# Patient Record
Sex: FEMALE | Race: WHITE | Employment: UNEMPLOYED | URBAN - METROPOLITAN AREA
[De-identification: names, ages, dates, MRNs, and addresses within clinical notes are randomized per-mention and may not be internally consistent; named-entity substitution may affect disease eponyms.]

---

## 2017-12-20 PROBLEM — B00.50 HERPES SIMPLEX OF EYE: Status: ACTIVE | Noted: 2017-12-20

## 2017-12-20 PROBLEM — R30.0 DYSURIA: Status: ACTIVE | Noted: 2017-12-20

## 2017-12-20 PROBLEM — Z34.81 MULTIGRAVIDA IN FIRST TRIMESTER: Status: ACTIVE | Noted: 2017-12-20

## 2018-01-16 PROBLEM — Z34.01 PRIMIGRAVIDA IN FIRST TRIMESTER: Status: ACTIVE | Noted: 2017-12-20

## 2018-01-16 PROBLEM — R30.0 DYSURIA: Status: RESOLVED | Noted: 2017-12-20 | Resolved: 2018-01-16

## 2018-05-08 PROBLEM — O99.013 ANEMIA IN PREGNANCY, THIRD TRIMESTER: Status: ACTIVE | Noted: 2018-05-08

## 2018-05-08 PROBLEM — Z34.03 PRIMIGRAVIDA IN THIRD TRIMESTER: Status: ACTIVE | Noted: 2017-12-20

## 2018-07-30 ENCOUNTER — HOSPITAL ENCOUNTER (INPATIENT)
Age: 23
LOS: 2 days | Discharge: HOME | End: 2018-08-01
Attending: OBSTETRICS & GYNECOLOGY | Admitting: OBSTETRICS & GYNECOLOGY
Payer: SELF-PAY

## 2018-07-30 PROBLEM — Z34.90 ENCOUNTER FOR PLANNED INDUCTION OF LABOR: Status: ACTIVE | Noted: 2018-07-30

## 2018-07-30 PROBLEM — Z3A.40 40 WEEKS GESTATION OF PREGNANCY: Status: ACTIVE | Noted: 2018-07-30

## 2018-07-30 LAB
ABO + RH BLD: NORMAL
BLOOD GROUP ANTIBODIES SERPL: NORMAL
ERYTHROCYTE [DISTWIDTH] IN BLOOD BY AUTOMATED COUNT: 13.9 % (ref 11.9–14.6)
HCT VFR BLD AUTO: 35.8 % (ref 35.8–46.3)
HGB BLD-MCNC: 12 G/DL (ref 11.7–15.4)
MCH RBC QN AUTO: 31.5 PG (ref 26.1–32.9)
MCHC RBC AUTO-ENTMCNC: 33.5 G/DL (ref 31.4–35)
MCV RBC AUTO: 94 FL (ref 79.6–97.8)
PLATELET # BLD AUTO: 209 K/UL (ref 150–450)
PMV BLD AUTO: 11.7 FL (ref 10.8–14.1)
RBC # BLD AUTO: 3.81 M/UL (ref 4.05–5.25)
SPECIMEN EXP DATE BLD: NORMAL
WBC # BLD AUTO: 11 K/UL (ref 4.3–11.1)

## 2018-07-30 PROCEDURE — 85027 COMPLETE CBC AUTOMATED: CPT | Performed by: OBSTETRICS & GYNECOLOGY

## 2018-07-30 PROCEDURE — 74011250637 HC RX REV CODE- 250/637: Performed by: OBSTETRICS & GYNECOLOGY

## 2018-07-30 PROCEDURE — 74011250636 HC RX REV CODE- 250/636: Performed by: OBSTETRICS & GYNECOLOGY

## 2018-07-30 PROCEDURE — 74011258636 HC RX REV CODE- 258/636: Performed by: OBSTETRICS & GYNECOLOGY

## 2018-07-30 PROCEDURE — 65270000029 HC RM PRIVATE

## 2018-07-30 PROCEDURE — 86900 BLOOD TYPING SEROLOGIC ABO: CPT | Performed by: OBSTETRICS & GYNECOLOGY

## 2018-07-30 RX ORDER — CALCIUM CARBONATE 200(500)MG
200 TABLET,CHEWABLE ORAL
Status: DISCONTINUED | OUTPATIENT
Start: 2018-07-30 | End: 2018-08-01 | Stop reason: HOSPADM

## 2018-07-30 RX ORDER — MORPHINE SULFATE 10 MG/ML
5 INJECTION, SOLUTION INTRAMUSCULAR; INTRAVENOUS
Status: DISCONTINUED | OUTPATIENT
Start: 2018-07-30 | End: 2018-07-31 | Stop reason: HOSPADM

## 2018-07-30 RX ORDER — ONDANSETRON 2 MG/ML
4 INJECTION INTRAMUSCULAR; INTRAVENOUS
Status: DISCONTINUED | OUTPATIENT
Start: 2018-07-30 | End: 2018-08-01 | Stop reason: HOSPADM

## 2018-07-30 RX ORDER — OXYTOCIN/RINGER'S LACTATE 30/500 ML
.5-2 PLASTIC BAG, INJECTION (ML) INTRAVENOUS
Status: DISCONTINUED | OUTPATIENT
Start: 2018-07-30 | End: 2018-07-31 | Stop reason: HOSPADM

## 2018-07-30 RX ORDER — ACETAMINOPHEN 325 MG/1
650 TABLET ORAL
Status: DISCONTINUED | OUTPATIENT
Start: 2018-07-30 | End: 2018-08-01 | Stop reason: HOSPADM

## 2018-07-30 RX ORDER — SODIUM CHLORIDE 0.9 % (FLUSH) 0.9 %
5-10 SYRINGE (ML) INJECTION EVERY 8 HOURS
Status: DISCONTINUED | OUTPATIENT
Start: 2018-07-30 | End: 2018-08-01 | Stop reason: HOSPADM

## 2018-07-30 RX ORDER — MINERAL OIL
120 OIL (ML) ORAL
Status: DISCONTINUED | OUTPATIENT
Start: 2018-07-30 | End: 2018-07-31 | Stop reason: HOSPADM

## 2018-07-30 RX ORDER — FAMOTIDINE 20 MG/1
20 TABLET, FILM COATED ORAL
Status: DISCONTINUED | OUTPATIENT
Start: 2018-07-30 | End: 2018-08-01 | Stop reason: HOSPADM

## 2018-07-30 RX ORDER — OXYTOCIN/0.9 % SODIUM CHLORIDE 15/250 ML
250 PLASTIC BAG, INJECTION (ML) INTRAVENOUS ONCE
Status: COMPLETED | OUTPATIENT
Start: 2018-07-30 | End: 2018-07-31

## 2018-07-30 RX ORDER — LIDOCAINE HYDROCHLORIDE 10 MG/ML
1 INJECTION INFILTRATION; PERINEURAL
Status: DISCONTINUED | OUTPATIENT
Start: 2018-07-30 | End: 2018-07-31 | Stop reason: HOSPADM

## 2018-07-30 RX ORDER — LIDOCAINE HYDROCHLORIDE 20 MG/ML
JELLY TOPICAL
Status: DISCONTINUED | OUTPATIENT
Start: 2018-07-30 | End: 2018-07-31 | Stop reason: HOSPADM

## 2018-07-30 RX ORDER — VALACYCLOVIR HYDROCHLORIDE 500 MG/1
500 TABLET, FILM COATED ORAL DAILY
Status: DISCONTINUED | OUTPATIENT
Start: 2018-07-31 | End: 2018-08-01 | Stop reason: HOSPADM

## 2018-07-30 RX ORDER — DEXTROSE, SODIUM CHLORIDE, SODIUM LACTATE, POTASSIUM CHLORIDE, AND CALCIUM CHLORIDE 5; .6; .31; .03; .02 G/100ML; G/100ML; G/100ML; G/100ML; G/100ML
125 INJECTION, SOLUTION INTRAVENOUS CONTINUOUS
Status: DISCONTINUED | OUTPATIENT
Start: 2018-07-30 | End: 2018-08-01

## 2018-07-30 RX ORDER — SODIUM CHLORIDE 0.9 % (FLUSH) 0.9 %
5-10 SYRINGE (ML) INJECTION AS NEEDED
Status: DISCONTINUED | OUTPATIENT
Start: 2018-07-30 | End: 2018-08-01 | Stop reason: HOSPADM

## 2018-07-30 RX ORDER — ZOLPIDEM TARTRATE 5 MG/1
5 TABLET ORAL
Status: DISCONTINUED | OUTPATIENT
Start: 2018-07-30 | End: 2018-07-31 | Stop reason: SDUPTHER

## 2018-07-30 RX ADMIN — ZOLPIDEM TARTRATE 5 MG: 5 TABLET ORAL at 22:43

## 2018-07-30 RX ADMIN — SODIUM CHLORIDE, SODIUM LACTATE, POTASSIUM CHLORIDE, AND CALCIUM CHLORIDE 500 ML: 600; 310; 30; 20 INJECTION, SOLUTION INTRAVENOUS at 22:10

## 2018-07-30 RX ADMIN — MISOPROSTOL 50 MCG: 100 TABLET ORAL at 20:17

## 2018-07-30 RX ADMIN — SODIUM CHLORIDE, SODIUM LACTATE, POTASSIUM CHLORIDE, CALCIUM CHLORIDE, AND DEXTROSE MONOHYDRATE 125 ML/HR: 600; 310; 30; 20; 5 INJECTION, SOLUTION INTRAVENOUS at 22:03

## 2018-07-30 RX ADMIN — ACETAMINOPHEN 650 MG: 325 TABLET ORAL at 21:32

## 2018-07-30 NOTE — PROGRESS NOTES
Patient ID verified. Allergies, medical history, prenatal record and prior to admission medications verified. Pt instructed to be NPO after midnight. Pt instructed to arrive at hospital @1800,come to entrance C and sign in at the registration desk on the 4th floor. Patient instructed to come to hospital sooner if SROM, labor, or concerning symptoms. Patient verbalized understanding. Questions encouraged and answered.

## 2018-07-30 NOTE — PROGRESS NOTES
Spoke with Vida Lara Md, PRN medication orders received.  Per Md if pt is less than 4cm give Cytotec (may give if 3cm), if 4cm or greater hold next dose of Cytotec and start IV Pitocin at 6am.

## 2018-07-30 NOTE — IP AVS SNAPSHOT
[address] 
[phone] Patient: Katarina Gotti MRN: [id number] :1995 About your hospitalization You were admitted on:  2018 You last received care in the:  [address] [facility] You were discharged on:  2018 Why you were hospitalized Your primary diagnosis was:  Not on File Your diagnoses also included:  40 Weeks Gestation Of Pregnancy, Encounter For Planned Induction Of Labor Follow-up Information Follow up With Details Comments Contact Info Joyce Pérez MD Schedule an appointment as soon as possible for a visit in 6 weeks for postpartum checkup [address] [facility] [address] 
[phone] Your Scheduled Appointments 2018 10:15 AM EDT  
EST GYN with Joyce Pérez MD  
[facility] ([facility]) [address] [place] [id number]  
[phone] Discharge Orders None A check karol indicates which time of day the medication should be taken. My Medications START taking these medications Instructions Each Dose to Equal  
 Morning Noon Evening Bedtime HYDROcodone-acetaminophen 5-325 mg per tablet Commonly known as:  Mirta Sathya Your last dose was: Your next dose is: Take 1 Tab by mouth every six (6) hours as needed. Max Daily Amount: 4 Tabs. 1 Tab  
    
   
   
   
  
 ibuprofen 600 mg tablet Commonly known as:  MOTRIN Your last dose was: Your next dose is: Take 1 Tab by mouth every six (6) hours as needed. 600 mg CONTINUE taking these medications Instructions Each Dose to Equal  
 Morning Noon Evening Bedtime  
 ferrous sulfate 325 mg (65 mg iron) tablet Your last dose was: Your next dose is: Take  by mouth two (2) times a day. fluorometholone 0.1 % ophthalmic suspension Commonly known as:  FML Your last dose was: Your next dose is:    
   
   
 Administer 1 Drop to both eyes daily. 1 Drop PRENATAL VITAMIN PO Your last dose was: Your next dose is: Take  by mouth. valACYclovir 500 mg tablet Commonly known as:  VALTREX Your last dose was: Your next dose is: TAKE 1 TABLET BY MOUTH EVERY DAY Where to Get Your Medications Information on where to get these meds will be given to you by the nurse or doctor. ! Ask your nurse or doctor about these medications HYDROcodone-acetaminophen 5-325 mg per tablet  
 ibuprofen 600 mg tablet Opioid Education Prescription Opioids: What You Need to Know: 
 
Prescription opioids can be used to help relieve moderate-to-severe pain and are often prescribed following a surgery or injury, or for certain health conditions. These medications can be an important part of treatment but also come with serious risks. Opioids are strong pain medicines. Examples include hydrocodone, oxycodone, fentanyl, and morphine. Heroin is an example of an illegal opioid. It is important to work with your health care provider to make sure you are getting the safest, most effective care. WHAT ARE THE RISKS AND SIDE EFFECTS OF OPIOID USE? Prescription opioids carry serious risks of addiction and overdose, especially with prolonged use. An opioid overdose, often marked by slow breathing, can cause sudden death. The use of prescription opioids can have a number of side effects as well, even when taken as directed. · Tolerance-meaning you might need to take more of a medication for the same pain relief · Physical dependence-meaning you have symptoms of withdrawal when the medication is stopped. Withdrawal symptoms can include nausea, sweating, chills, diarrhea, stomach cramps, and muscle aches. Withdrawal can last up to several weeks, depending on which drug you took and how long you took it. · Increased sensitivity to pain · Constipation · Nausea, vomiting, and dry mouth · Sleepiness and dizziness · Confusion · Depression · Low levels of testosterone that can result in lower sex drive, energy, and strength · Itching and sweating RISKS ARE GREATER WITH:      
· History of drug misuse, substance use disorder, or overdose · Mental health conditions (such as depression or anxiety) · Sleep apnea · Older age (72 years or older) · Pregnancy Avoid alcohol while taking prescription opioids. Also, unless specifically advised by your health care provider, medications to avoid include: · Benzodiazepines (such as Xanax or Valium) · Muscle relaxants (such as Soma or Flexeril) · Hypnotics (such as Ambien or Lunesta) · Other prescription opioids KNOW YOUR OPTIONS Talk to your health care provider about ways to manage your pain that don't involve prescription opioids. Some of these options may actually work better and have fewer risks and side effects. Options may include: 
· Pain relievers such as acetaminophen, ibuprofen, and naproxen · Some medications that are also used for depression or seizures · Physical therapy and exercise · Counseling to help patients learn how to cope better with triggers of pain and stress. · Application of heat or cold compress · Massage therapy · Relaxation techniques Be Informed Make sure you know the name of your medication, how much and how often to take it, and its potential risks & side effects. IF YOU ARE PRESCRIBED OPIOIDS FOR PAIN: 
· Never take opioids in greater amounts or more often than prescribed. Remember the goal is not to be pain-free but to manage your pain at a tolerable level. · Follow up with your primary care provider to: · Work together to create a plan on how to manage your pain. · Talk about ways to help manage your pain that don't involve prescription opioids. · Talk about any and all concerns and side effects. · Help prevent misuse and abuse. · Never sell or share prescription opioids · Help prevent misuse and abuse. · Store prescription opioids in a secure place and out of reach of others (this may include visitors, children, friends, and family). · Safely dispose of unused/unwanted prescription opioids: Find your community drug take-back program or your pharmacy mail-back program, or flush them down the toilet, following guidance from the Food and Drug Administration (www.fda.gov/Drugs/ResourcesForYou). · Visit www.cdc.gov/drugoverdose to learn about the risks of opioid abuse and overdose. · If you believe you may be struggling with addiction, tell your health care provider and ask for guidance or call [employer] at 5-898-139-HSPY. Discharge Instructions Discharge instruction to follow: Activity: Pelvis rest for 6 weeks No heavy lifting over 15 lbs for 2 weeks No driving for 2 weeks No push/pull motion such as sweeping or vacuuming for 2 weeks No tub baths for 6 weeks Continue using the hygenique wand after each void or bowel movement. If using sitz bath continue until comfortable stopping. If using jhoana-bottle continue to use until comfortable stopping. Change sanitary pad after each urination or bowel movement. Call MD for the following: 
    Fever over 101 F; pain not relieved by medication; foul smelling vaginal discharge or an increase in vaginal bleeding. Take medication as prescribed. Follow up with MD as order. Vaginal Childbirth: Care Instructions Your Care Instructions Your body will slowly heal in the next few weeks. It is easy to get too tired and overwhelmed during the first weeks after your baby is born. Changes in your hormones can shift your mood without warning. You may find it hard to meet the extra demands on your energy and time. Take it easy on yourself. Follow-up care is a key part of your treatment and safety. Be sure to make and go to all appointments, and call your doctor if you are having problems. It's also a good idea to know your test results and keep a list of the medicines you take. How can you care for yourself at home? · Vaginal bleeding and cramps ¨ After delivery, you will have a bloody discharge from the vagina. This will turn pink within a week and then white or yellow after about 10 days. It may last for 2 to 4 weeks or longer, until the uterus has healed. Use pads instead of tampons until you stop bleeding. ¨ Do not worry if you pass some blood clots, as long as they are smaller than a golf ball. If you have a tear or stitches in your vaginal area, change the pad at least every 4 hours to prevent soreness and infection. ¨ You may have cramps for the first few days after childbirth. These are normal and occur as the uterus shrinks to normal size. Take an over-the-counter pain medicine, such as acetaminophen (Tylenol), ibuprofen (Advil, Motrin), or naproxen (Aleve), for cramps. Read and follow all instructions on the label. Do not take aspirin, because it can cause more bleeding. ¨ Do not take two or more pain medicines at the same time unless the doctor told you to. Many pain medicines have acetaminophen, which is Tylenol. Too much acetaminophen (Tylenol) can be harmful. · Stitches ¨ If you have stitches, they will dissolve on their own and do not need to be removed. Follow your doctor's instructions for cleaning the stitched area.  
¨ Put ice or a cold pack on your painful area for 10 to 20 minutes at a time, several times a day, for the first few days. Put a thin cloth between the ice and your skin. ¨ Sit in a few inches of warm water (sitz bath) 3 times a day and after bowel movements. The warm water helps with pain and itching. If you do not have a tub, a warm shower might help. · Breast fullness ¨ Your breasts may overfill (engorge) in the first few days after delivery. To help milk flow and to relieve pain, warm your breasts in the shower or by using warm, moist towels before nursing. ¨ If you are not nursing, do not put warmth on your breasts or touch your breasts. Wear a tight bra or sports bra and use ice until the fullness goes away. This usually takes 2 to 3 days. ¨ Put ice or a cold pack on your breast after nursing to reduce swelling and pain. Put a thin cloth between the ice and your skin. · Activity ¨ Eat a balanced diet. Do not try to lose weight by cutting calories. Keep taking your prenatal vitamins, or take a multivitamin. ¨ Get as much rest as you can. Try to take naps when your baby sleeps during the day. ¨ Get some exercise every day. But do not do any heavy exercise until your doctor says it is okay. ¨ Wait until you are healed (about 4 to 6 weeks) before you have sexual intercourse. Your doctor will tell you when it is okay to have sex. ¨ Talk to your doctor about birth control. You can get pregnant even before your period returns. Also, you can get pregnant while you are breastfeeding. · Mental health ¨ It is normal to have some sadness, anxiety, sleeplessness, and mood swings after you go home. If you feel upset or hopeless for more than a few days or are having trouble doing the things you need to do, talk to your doctor. · Constipation and hemorrhoids ¨ Drink plenty of fluids, enough so that your urine is light yellow or clear like water.  If you have kidney, heart, or liver disease and have to limit fluids, talk with your doctor before you increase the amount of fluids you drink. ¨ Eat plenty of fiber each day. Have a bran muffin or bran cereal for breakfast, and try eating a piece of fruit for a mid-afternoon snack. ¨ For painful, itchy hemorrhoids, put ice or a cold pack on the area several times a day for 10 minutes at a time. Follow this by putting a warm compress on the area for another 10 to 20 minutes or by sitting in a shallow, warm bath. When should you call for help? Call 911 anytime you think you may need emergency care. For example, call if: 
  · You passed out (lost consciousness).  
 Call your doctor now or seek immediate medical care if: 
  · You have severe vaginal bleeding.  
  · You are dizzy or lightheaded, or you feel like you may faint.  
  · You have a fever.  
  · You have new or more pain in your belly or pelvis.  
 Watch closely for changes in your health, and be sure to contact your doctor if: 
  · Your vaginal bleeding seems to be getting heavier.  
  · You have new or worse vaginal discharge.  
  · You feel sad, anxious, or hopeless for more than a few days.  
  · You do not get better as expected. Where can you learn more? Go to http://leland-chaim.info/. Enter Z803 in the search box to learn more about \"Vaginal Childbirth: Care Instructions. \" Current as of: November 21, 2017 Content Version: 11.7 © 9136-6564 [employer]. Care instructions adapted under license by [employer] (which disclaims liability or warranty for this information). If you have questions about a medical condition or this instruction, always ask your healthcare professional. [place] [address] any warranty or liability for your use of this information. Introducing [facility] & HEALTH SERVICES! Dear Prasad Quinones: Thank you for requesting a [employer] account. Our records indicate that you already have an active [employer] account. You can access your account anytime at [email or web address] Did you know that you can access your hospital and ER discharge instructions at any time in MyPublisher? You can also review all of your test results from your hospital stay or ER visit. Additional Information If you have questions, please visit the Frequently Asked Questions section of the [employer] website at [email or web address] Remember, MyPublisher is NOT to be used for urgent needs. For medical emergencies, dial 911. Now available from your iPhone and Android! Introducing Samuel Bedoya As a [facility] patient, I wanted to make you aware of our electronic visit tool called Samuel Bedoya. [employer]Samba [employer]ds allows you to connect within minutes with a medical provider 24 hours a day, seven days a week via a mobile device or tablet or logging into a secure website from your computer. You can access Samuel Bedoya from anywhere in the [place]. A virtual visit might be right for you when you have a simple condition and feel like you just dont want to get out of bed, or cant get away from work for an appointment, when your regular [facility] provider is not available (evenings, weekends or holidays), or when youre out of town and need minor care. Electronic visits cost only $49 and if the [employer] provider determines a prescription is needed to treat your condition, one can be electronically transmitted to a nearby pharmacy*. Please take a moment to enroll today if you have not already done so. The enrollment process is free and takes just a few minutes. To enroll, please download the [employer] victoria to your tablet or phone, or visit [email or web address] to enroll on your computer. And, as an [address] [place] [address] patient with a [employer]approved account, the results of your visits will be scanned into your electronic medical record and your primary care provider will be able to view the scanned results. We urge you to continue to see your regular [employer] provider for your ongoing medical care. And while your primary care provider may not be the one available when you seek a Samuel Tangdavidfin virtual visit, the peace of mind you get from getting a real diagnosis real time can be priceless. For more information on Samuel Directlydavidfin, view our Frequently Asked Questions (FAQs) at [email or web address]. Sincerely, 
 
Key Cardona MD 
Chief Medical Officer [employer] *:  certain medications cannot be prescribed via [employer] Providers Seen During Your Hospitalization Provider Specialty Primary office phone Cory Sun MD Obstetrics & Gynecology [phone] Immunizations Administered for This Admission Name Date Tdap  Deferred () Your Primary Care Physician (PCP) Primary Care Physician Office Phone Office Fax Moni Palacios [phone] You are allergic to the following No active allergies Recent Documentation Breastfeeding? OB Status Smoking Status Unknown Recent pregnancy Never Smoker Emergency Contacts Name Discharge Info Relation Home Work Mobile Shannan Abdalla  Spouse [phone] Patient Belongings The following personal items are in your possession at time of discharge: 
     Visual Aid: None      Home Medications: None   Jewelry: None  Clothing: At bedside    Other Valuables: Cell Phone Please provide this summary of care documentation to your next provider. Signatures-by signing, you are acknowledging that this After Visit Summary has been reviewed with you and you have received a copy. Patient Signature:  ____________________________________________________________ Date:  ____________________________________________________________  
  
[employer]  Provider Signature: ____________________________________________________________ Date:  ____________________________________________________________

## 2018-07-31 ENCOUNTER — ANESTHESIA EVENT (OUTPATIENT)
Dept: LABOR AND DELIVERY | Age: 23
End: 2018-07-31
Payer: SELF-PAY

## 2018-07-31 ENCOUNTER — ANESTHESIA (OUTPATIENT)
Dept: LABOR AND DELIVERY | Age: 23
End: 2018-07-31
Payer: SELF-PAY

## 2018-07-31 PROCEDURE — 77030011943

## 2018-07-31 PROCEDURE — 4A1HXCZ MONITORING OF PRODUCTS OF CONCEPTION, CARDIAC RATE, EXTERNAL APPROACH: ICD-10-PCS | Performed by: OBSTETRICS & GYNECOLOGY

## 2018-07-31 PROCEDURE — 77030018846 HC SOL IRR STRL H20 ICUM -A

## 2018-07-31 PROCEDURE — 77030003028 HC SUT VCRL J&J -A

## 2018-07-31 PROCEDURE — 75410000003 HC RECOV DEL/VAG/CSECN EA 0.5 HR: Performed by: OBSTETRICS & GYNECOLOGY

## 2018-07-31 PROCEDURE — 59400 OBSTETRICAL CARE: CPT | Performed by: OBSTETRICS & GYNECOLOGY

## 2018-07-31 PROCEDURE — 74011250636 HC RX REV CODE- 250/636: Performed by: OBSTETRICS & GYNECOLOGY

## 2018-07-31 PROCEDURE — 0UQMXZZ REPAIR VULVA, EXTERNAL APPROACH: ICD-10-PCS | Performed by: OBSTETRICS & GYNECOLOGY

## 2018-07-31 PROCEDURE — 65270000029 HC RM PRIVATE

## 2018-07-31 PROCEDURE — 75410000000 HC DELIVERY VAGINAL/SINGLE

## 2018-07-31 PROCEDURE — A4300 CATH IMPL VASC ACCESS PORTAL: HCPCS | Performed by: ANESTHESIOLOGY

## 2018-07-31 PROCEDURE — 74011250636 HC RX REV CODE- 250/636

## 2018-07-31 PROCEDURE — 76060000078 HC EPIDURAL ANESTHESIA

## 2018-07-31 PROCEDURE — 74011250637 HC RX REV CODE- 250/637: Performed by: OBSTETRICS & GYNECOLOGY

## 2018-07-31 PROCEDURE — 77030020255 HC SOL INJ LR 1000ML BG

## 2018-07-31 PROCEDURE — 0KQM0ZZ REPAIR PERINEUM MUSCLE, OPEN APPROACH: ICD-10-PCS | Performed by: OBSTETRICS & GYNECOLOGY

## 2018-07-31 PROCEDURE — 75410000002 HC LABOR FEE PER 1 HR

## 2018-07-31 PROCEDURE — 77030014125 HC TY EPDRL BBMI -B: Performed by: ANESTHESIOLOGY

## 2018-07-31 RX ORDER — ROPIVACAINE HYDROCHLORIDE 2 MG/ML
INJECTION, SOLUTION EPIDURAL; INFILTRATION; PERINEURAL AS NEEDED
Status: DISCONTINUED | OUTPATIENT
Start: 2018-07-31 | End: 2018-07-31 | Stop reason: HOSPADM

## 2018-07-31 RX ORDER — DIPHENHYDRAMINE HCL 25 MG
25 CAPSULE ORAL
Status: DISCONTINUED | OUTPATIENT
Start: 2018-07-31 | End: 2018-08-01 | Stop reason: HOSPADM

## 2018-07-31 RX ORDER — ROPIVACAINE HYDROCHLORIDE 2 MG/ML
INJECTION, SOLUTION EPIDURAL; INFILTRATION; PERINEURAL
Status: DISCONTINUED | OUTPATIENT
Start: 2018-07-31 | End: 2018-07-31 | Stop reason: HOSPADM

## 2018-07-31 RX ORDER — OXYCODONE HYDROCHLORIDE 5 MG/1
5-10 TABLET ORAL
Status: DISCONTINUED | OUTPATIENT
Start: 2018-07-31 | End: 2018-08-01 | Stop reason: HOSPADM

## 2018-07-31 RX ORDER — PRENATAL VIT 96/IRON FUM/FOLIC 27MG-0.8MG
1 TABLET ORAL DAILY
Status: DISCONTINUED | OUTPATIENT
Start: 2018-07-31 | End: 2018-08-01 | Stop reason: HOSPADM

## 2018-07-31 RX ORDER — DOCUSATE SODIUM 100 MG/1
100 CAPSULE, LIQUID FILLED ORAL
Status: DISCONTINUED | OUTPATIENT
Start: 2018-07-31 | End: 2018-08-01 | Stop reason: HOSPADM

## 2018-07-31 RX ORDER — HYDROCODONE BITARTRATE AND ACETAMINOPHEN 5; 325 MG/1; MG/1
1 TABLET ORAL
Status: DISCONTINUED | OUTPATIENT
Start: 2018-07-31 | End: 2018-08-01 | Stop reason: HOSPADM

## 2018-07-31 RX ORDER — OXYTOCIN/0.9 % SODIUM CHLORIDE 15/250 ML
250 PLASTIC BAG, INJECTION (ML) INTRAVENOUS ONCE
Status: ACTIVE | OUTPATIENT
Start: 2018-07-31 | End: 2018-07-31

## 2018-07-31 RX ORDER — ZOLPIDEM TARTRATE 5 MG/1
5 TABLET ORAL
Status: DISCONTINUED | OUTPATIENT
Start: 2018-07-31 | End: 2018-08-01 | Stop reason: HOSPADM

## 2018-07-31 RX ORDER — IBUPROFEN 600 MG/1
600 TABLET ORAL
Status: DISCONTINUED | OUTPATIENT
Start: 2018-07-31 | End: 2018-08-01 | Stop reason: HOSPADM

## 2018-07-31 RX ORDER — SODIUM CHLORIDE 0.9 % (FLUSH) 0.9 %
5-10 SYRINGE (ML) INJECTION AS NEEDED
Status: DISCONTINUED | OUTPATIENT
Start: 2018-07-31 | End: 2018-07-31 | Stop reason: HOSPADM

## 2018-07-31 RX ORDER — PROMETHAZINE HYDROCHLORIDE 25 MG/1
25 TABLET ORAL
Status: DISCONTINUED | OUTPATIENT
Start: 2018-07-31 | End: 2018-08-01 | Stop reason: HOSPADM

## 2018-07-31 RX ORDER — SIMETHICONE 80 MG
80 TABLET,CHEWABLE ORAL
Status: DISCONTINUED | OUTPATIENT
Start: 2018-07-31 | End: 2018-08-01 | Stop reason: HOSPADM

## 2018-07-31 RX ORDER — SODIUM CHLORIDE 0.9 % (FLUSH) 0.9 %
5-10 SYRINGE (ML) INJECTION EVERY 8 HOURS
Status: DISCONTINUED | OUTPATIENT
Start: 2018-07-31 | End: 2018-07-31 | Stop reason: HOSPADM

## 2018-07-31 RX ADMIN — ROPIVACAINE HYDROCHLORIDE 8 ML/HR: 2 INJECTION, SOLUTION EPIDURAL; INFILTRATION; PERINEURAL at 00:30

## 2018-07-31 RX ADMIN — Medication 10 ML: at 04:48

## 2018-07-31 RX ADMIN — IBUPROFEN 600 MG: 600 TABLET ORAL at 21:57

## 2018-07-31 RX ADMIN — Medication 1 SPRAY: at 07:51

## 2018-07-31 RX ADMIN — IBUPROFEN 600 MG: 600 TABLET ORAL at 05:52

## 2018-07-31 RX ADMIN — HYDROCODONE BITARTRATE AND ACETAMINOPHEN 1 TABLET: 5; 325 TABLET ORAL at 17:31

## 2018-07-31 RX ADMIN — HYDROCODONE BITARTRATE AND ACETAMINOPHEN 1 TABLET: 5; 325 TABLET ORAL at 13:38

## 2018-07-31 RX ADMIN — PRENATAL VIT W/ FE FUMARATE-FA TAB 27-0.8 MG 1 TABLET: 27-0.8 TAB at 07:51

## 2018-07-31 RX ADMIN — ROPIVACAINE HYDROCHLORIDE 8 ML: 2 INJECTION, SOLUTION EPIDURAL; INFILTRATION; PERINEURAL at 00:30

## 2018-07-31 RX ADMIN — WITCH HAZEL 1 PAD: 500 SOLUTION RECTAL; TOPICAL at 07:51

## 2018-07-31 RX ADMIN — OXYTOCIN 15000 MILLI-UNITS/HR: 10 INJECTION, SOLUTION INTRAMUSCULAR; INTRAVENOUS at 04:11

## 2018-07-31 RX ADMIN — HYDROCODONE BITARTRATE AND ACETAMINOPHEN 1 TABLET: 5; 325 TABLET ORAL at 07:50

## 2018-07-31 RX ADMIN — IBUPROFEN 600 MG: 600 TABLET ORAL at 13:38

## 2018-07-31 RX ADMIN — HYDROCODONE BITARTRATE AND ACETAMINOPHEN 1 TABLET: 5; 325 TABLET ORAL at 21:57

## 2018-07-31 NOTE — LACTATION NOTE

## 2018-07-31 NOTE — PROGRESS NOTES
SVE done as pt noted to be very uncomfortable with ctx, breathing thru them and needing frequent position changes.

## 2018-07-31 NOTE — PROGRESS NOTES
S/p delivery viable male . With assist pt able to Bf  as she states her feeding plan is to BF. Was given Motrin for mild abdominal cramping, and was able to get OOB with the RN and able to Void 800cc's, jhoana-care done and back to bed.

## 2018-07-31 NOTE — H&P
Mika Andrews OB H&P Assessment/Plan Problem List  Date Reviewed: 2018 Codes Class 40 weeks gestation of pregnancy ICD-10-CM: Z3A.40 
ICD-9-CM: V22.2 Encounter for planned induction of labor ICD-10-CM: Z34.90 ICD-9-CM: V22.1 Anemia in pregnancy, third trimester ICD-10-CM: O99.013 ICD-9-CM: 400.54 Overview Signed 2018 11:29 AM by Nichelle Rodriguez MD  
  Additional Fe Primigravida in third trimester ICD-10-CM: Z34.03 
ICD-9-CM: V22.0 Overview Addendum 3/12/2018 10:41 AM by Nichelle Rodriguez MD  
  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  
  On valtrex and steroid drops for supression Subjective Gerson [place] 23 y.o.  40w4d  presented to L&D for postdates IOL. Pt has no complaints today. Pt denies vaginal bleeding/discharge. No LOF.  +FM. OB History  Para Term  AB Living  
1 SAB TAB Ectopic Molar Multiple Live Births # Outcome Date GA Lbr Pete/2nd Weight Sex Delivery Anes PTL Lv  
1 Current Past Medical History:  
Diagnosis Date  Corneal ulcer of left eye  Herpes   
 eye lesion History reviewed. No pertinent surgical history. Family History Problem Relation Age of Onset  No Known Problems Mother  No Known Problems Father Social History Social History  Marital status:  Spouse name: N/A  
 Number of children: N/A  
 Years of education: N/A Occupational History  Not on file. Social History Main Topics  Smoking status: Never Smoker  Smokeless tobacco: Never Used  Alcohol use No  
   Comment: rarely  Drug use: No  
 Sexual activity: Yes  
  Partners: Male Other Topics Concern  Caffeine Concern No  
 Exercise Yes  [facility] Yes  Self-Exams Yes Social History Narrative Abuse: Feels safe at home, no history of physical abuse, no history of sexual abuse No Known Allergies Review of Systems: 
 
Constitutional: No fevers or chills Prenatal: + fetal movement, no VB/DC, no LOF  
 
CV: No chest pain or palpatations Resp: No SOB or cough GI: No nausea/vomiting/diarrhea/constipation Neuro: No HA, no seizure like activity Skin: No rashes or lesions Breast: No breast pain : No dysuria or hematuria Prenatal Record Review The prenatal record has been reviewed. Prenatal Labs:  
No results found for: RUBELLAEXT, GRBSEXT, HBSAGEXT, HIVEXT, RPREXT, GONNOEXT, CHLAMEXT Objective Visit Vitals  BP (!) 138/92  Pulse 80  Temp 98.1 °F (36.7 °C)  LMP 10/20/2017 (Exact Date)  Breastfeeding No  
 
 
 
Obstetric Exam 
 
SVE: 1/40/-3 Physical Exam 
 
Gen: alert and cooperative, NAD HEENT: NCAT 
 
CV: RRR 
 
RESP: CTA bilat ABD: Gravid, soft, NT 
 
EXT: trace edema bilat NEURO: No focal deficits SKIN: No noted rashes or lesions Marj Mcclellan MD 
3:41 AM 
07/31/18

## 2018-07-31 NOTE — ANESTHESIA PROCEDURE NOTES
Epidural Block Start time: 7/31/2018 12:23 AM 
End time: 7/31/2018 12:30 AM 
Performed by: Clarissa Elizalde Authorized by: Clarissa Elizalde  
 
Pre-Procedure Indication: labor epidural   
Preanesthetic Checklist: patient identified, risks and benefits discussed, anesthesia consent, site marked, patient being monitored, timeout performed and anesthesia consent Timeout Time: 00:22 Epidural:  
Patient position:  Seated Prep region:  Lumbar Prep: Patient draped and Chlorhexidine Location:  L3-4 Needle and Epidural Catheter:  
Needle Type:  Tuohy Needle Gauge:  17 G Injection Technique:  Loss of resistance using air Attempts:  1 Catheter Size:  19 G Depth in Epidural Space (cm):  4 Events: no blood with aspiration, no cerebrospinal fluid with aspiration, no paresthesia and negative aspiration test   
Test Dose:  Negative and lidocaine 1.5% w/ epi Assessment:  
Catheter Secured:  Tegaderm and tape Insertion:  Uncomplicated Patient tolerance:  Patient tolerated the procedure well with no immediate complications

## 2018-07-31 NOTE — PROGRESS NOTES
Spoke with Cristina Farah Md updated him on SROM and pts CTX pattern, pt requests epidural, per Md pt may have epidural

## 2018-07-31 NOTE — PROGRESS NOTES
Pt lying down s/p epidural placement, pt positioned in L tilt and reporting good pain relief from ctx pain. A time out was done prior to placing epidural, pt name, , allergies and procedure were verified, see anesthesia records. LR IV fluid bolus 1L was given per epidural per anesthesia order

## 2018-07-31 NOTE — ANESTHESIA PREPROCEDURE EVALUATION
Anesthetic History Review of Systems / Medical History Patient summary reviewed, nursing notes reviewed and pertinent labs reviewed Pulmonary Neuro/Psych Cardiovascular Exercise tolerance: >4 METS 
  
GI/Hepatic/Renal 
  
 
 
 
 
 
 Endo/Other Obesity Other Findings Comments: Ocular herpes Physical Exam 
 
Airway Mallampati: I 
TM Distance: 4 - 6 cm Neck ROM: normal range of motion Mouth opening: Normal 
 
 Cardiovascular Dental 
No notable dental hx Pulmonary Abdominal 
GI exam deferred Other Findings Anesthetic Plan ASA: 1 Anesthesia type: epidural 
 
 
 
 
 
Anesthetic plan and risks discussed with: Patient and Spouse

## 2018-07-31 NOTE — ROUTINE PROCESS
SBAR IN Report: Mother Verbal report received from Monalisa MERIDA on this patient, who is now being transferred from L&D (unit) for routine progression of care. The patient is not wearing a green \"Anesthesia-Duramorph\" band. Report consisted of patient's Situation, Background, Assessment and Recommendations (SBAR). [place] ID bands were compared with the identification form, and verified with the patient and transferring nurse. Information from the SBAR and the [place]an Report was reviewed with the transferring nurse; opportunity for questions and clarification provided.

## 2018-07-31 NOTE — ANESTHESIA POSTPROCEDURE EVALUATION
Post-Anesthesia Evaluation and Assessment Patient: Tonia Hooks MRN: [id number]  SSN: [id number] YOB: 1995  Age: 24 y.o. Sex: female Cardiovascular Function/Vital Signs Visit Vitals  BP (!) 138/92  Pulse 80  Temp 36.7 °C (98.1 °F)  Breastfeeding No  
 
 
Patient is status post epidural anesthesia for * No procedures listed *. Nausea/Vomiting: None Postoperative hydration reviewed and adequate. Pain: 
Pain Scale 1: Numeric (0 - 10) (07/30/18 1926) Pain Intensity 1: 0 (07/30/18 1926) Managed Neurological Status: At baseline Mental Status and Level of Consciousness: Arousable Pulmonary Status:  
   
Adequate oxygenation and airway patent Complications related to anesthesia: None Post-anesthesia assessment completed. No concerns Signed By: Avis Velazquez MD   
 July 31, 2018

## 2018-07-31 NOTE — LACTATION NOTE
Assisted with breastfeeding in football and cross cradle on L. Baby fed fairly well. Some on and off, but can stay on. Demonstrated manual lip flange. Encouraged frequent feeding and watch output. Reviewed first 24 hours. Noted mom pumped earlier due to low blood sugars and got 5 ml. Mom can pump if baby does not nurse well.

## 2018-07-31 NOTE — L&D DELIVERY NOTE
Present for entire delivery. Details in delivery summary. . Viable Male Infant, APGARS 9,9 .   Weight 8 lbs. , 15 oz.  mL  Pain mgmt  Epidural    Nuchal cord x 1 manually reduced without difficulty    Placenta spont, intact, 3VC. 2nd degree midline perineal and left labial lacerations - all repaired 3-0 vicryl rapide    Pt and infant stable.         Darrell Mehat MD     4:29 AM    18

## 2018-07-31 NOTE — PROGRESS NOTES
SBAR OUT Report: Mother Verbal report given to [facility] RN on this patient, who is now being transferred to MIU  (unit) for routine progression of care. The patient is not wearing a green \"Anesthesia-Duramorph\" band. Report consisted of patient's Situation, Background, Assessment and Recommendations (SBAR). [place] ID bands were compared with the identification form, and verified with the patient and receiving nurse.  
 
Information from the SBAR, Intake/Output and MAR

## 2018-07-31 NOTE — PROGRESS NOTES
SVE 9.5cm, maternal position change from L tilt to R tilt as several FHR variables. Pt resting and plan to re-check SVE in aprox an hr.

## 2018-08-01 VITALS
TEMPERATURE: 97.6 F | HEART RATE: 72 BPM | RESPIRATION RATE: 16 BRPM | DIASTOLIC BLOOD PRESSURE: 66 MMHG | SYSTOLIC BLOOD PRESSURE: 113 MMHG

## 2018-08-01 PROCEDURE — 74011250637 HC RX REV CODE- 250/637: Performed by: OBSTETRICS & GYNECOLOGY

## 2018-08-01 RX ORDER — IBUPROFEN 600 MG/1
600 TABLET ORAL
Qty: 60 TAB | Refills: 1 | Status: SHIPPED | OUTPATIENT
Start: 2018-08-01 | End: 2018-09-11

## 2018-08-01 RX ORDER — HYDROCODONE BITARTRATE AND ACETAMINOPHEN 5; 325 MG/1; MG/1
1 TABLET ORAL
Qty: 15 TAB | Refills: 0 | Status: SHIPPED | OUTPATIENT
Start: 2018-08-01 | End: 2018-09-11 | Stop reason: ALTCHOICE

## 2018-08-01 RX ADMIN — HYDROCODONE BITARTRATE AND ACETAMINOPHEN 1 TABLET: 5; 325 TABLET ORAL at 11:07

## 2018-08-01 RX ADMIN — PRENATAL VIT W/ FE FUMARATE-FA TAB 27-0.8 MG 1 TABLET: 27-0.8 TAB at 11:07

## 2018-08-01 RX ADMIN — IBUPROFEN 600 MG: 600 TABLET ORAL at 04:18

## 2018-08-01 RX ADMIN — IBUPROFEN 600 MG: 600 TABLET ORAL at 11:07

## 2018-08-01 RX ADMIN — HYDROCODONE BITARTRATE AND ACETAMINOPHEN 1 TABLET: 5; 325 TABLET ORAL at 04:19

## 2018-08-01 NOTE — PROGRESS NOTES
Discharge teaching complete. Mother verbalized understanding, questions encouraged. [place] sheet signed.

## 2018-08-01 NOTE — DISCHARGE INSTRUCTIONS
Discharge instruction to follow: Activity: Pelvis rest for 6 weeks     No heavy lifting over 15 lbs for 2 weeks     No driving for 2 weeks     No push/pull motion such as sweeping or vacuuming for 2 weeks     No tub baths for 6 weeks    Continue using the hygenique wand after each void or bowel movement. If using sitz bath continue until comfortable stopping. If using jhoana-bottle continue to use until comfortable stopping. Change sanitary pad after each urination or bowel movement. Call MD for the following:      Fever over 101 F; pain not relieved by medication; foul smelling vaginal discharge or an increase in vaginal bleeding. Take medication as prescribed. Follow up with MD as order. Vaginal Childbirth: Care Instructions  Your Care Instructions    Your body will slowly heal in the next few weeks. It is easy to get too tired and overwhelmed during the first weeks after your baby is born. Changes in your hormones can shift your mood without warning. You may find it hard to meet the extra demands on your energy and time. Take it easy on yourself. Follow-up care is a key part of your treatment and safety. Be sure to make and go to all appointments, and call your doctor if you are having problems. It's also a good idea to know your test results and keep a list of the medicines you take. How can you care for yourself at home? · Vaginal bleeding and cramps  ¨ After delivery, you will have a bloody discharge from the vagina. This will turn pink within a week and then white or yellow after about 10 days. It may last for 2 to 4 weeks or longer, until the uterus has healed. Use pads instead of tampons until you stop bleeding. ¨ Do not worry if you pass some blood clots, as long as they are smaller than a golf ball. If you have a tear or stitches in your vaginal area, change the pad at least every 4 hours to prevent soreness and infection.   ¨ You may have cramps for the first few days after childbirth. These are normal and occur as the uterus shrinks to normal size. Take an over-the-counter pain medicine, such as acetaminophen (Tylenol), ibuprofen (Advil, Motrin), or naproxen (Aleve), for cramps. Read and follow all instructions on the label. Do not take aspirin, because it can cause more bleeding. ¨ Do not take two or more pain medicines at the same time unless the doctor told you to. Many pain medicines have acetaminophen, which is Tylenol. Too much acetaminophen (Tylenol) can be harmful. · Stitches  ¨ If you have stitches, they will dissolve on their own and do not need to be removed. Follow your doctor's instructions for cleaning the stitched area. ¨ Put ice or a cold pack on your painful area for 10 to 20 minutes at a time, several times a day, for the first few days. Put a thin cloth between the ice and your skin. ¨ Sit in a few inches of warm water (sitz bath) 3 times a day and after bowel movements. The warm water helps with pain and itching. If you do not have a tub, a warm shower might help. · Breast fullness  ¨ Your breasts may overfill (engorge) in the first few days after delivery. To help milk flow and to relieve pain, warm your breasts in the shower or by using warm, moist towels before nursing. ¨ If you are not nursing, do not put warmth on your breasts or touch your breasts. Wear a tight bra or sports bra and use ice until the fullness goes away. This usually takes 2 to 3 days. ¨ Put ice or a cold pack on your breast after nursing to reduce swelling and pain. Put a thin cloth between the ice and your skin. · Activity  ¨ Eat a balanced diet. Do not try to lose weight by cutting calories. Keep taking your prenatal vitamins, or take a multivitamin. ¨ Get as much rest as you can. Try to take naps when your baby sleeps during the day. ¨ Get some exercise every day. But do not do any heavy exercise until your doctor says it is okay.   ¨ Wait until you are healed (about 4 to 6 weeks) before you have sexual intercourse. Your doctor will tell you when it is okay to have sex. ¨ Talk to your doctor about birth control. You can get pregnant even before your period returns. Also, you can get pregnant while you are breastfeeding. · Mental health  ¨ It is normal to have some sadness, anxiety, sleeplessness, and mood swings after you go home. If you feel upset or hopeless for more than a few days or are having trouble doing the things you need to do, talk to your doctor. · Constipation and hemorrhoids  ¨ Drink plenty of fluids, enough so that your urine is light yellow or clear like water. If you have kidney, heart, or liver disease and have to limit fluids, talk with your doctor before you increase the amount of fluids you drink. ¨ Eat plenty of fiber each day. Have a bran muffin or bran cereal for breakfast, and try eating a piece of fruit for a mid-afternoon snack. ¨ For painful, itchy hemorrhoids, put ice or a cold pack on the area several times a day for 10 minutes at a time. Follow this by putting a warm compress on the area for another 10 to 20 minutes or by sitting in a shallow, warm bath. When should you call for help? Call 911 anytime you think you may need emergency care. For example, call if:    · You passed out (lost consciousness).    Call your doctor now or seek immediate medical care if:    · You have severe vaginal bleeding.     · You are dizzy or lightheaded, or you feel like you may faint.     · You have a fever.     · You have new or more pain in your belly or pelvis.    Watch closely for changes in your health, and be sure to contact your doctor if:    · Your vaginal bleeding seems to be getting heavier.     · You have new or worse vaginal discharge.     · You feel sad, anxious, or hopeless for more than a few days.     · You do not get better as expected. Where can you learn more? Go to http://leland-chaim.info/.   Enter R910 in the search box to learn more about \"Vaginal Childbirth: Care Instructions. \"  Current as of: November 21, 2017  Content Version: 11.7  © 5073-9516 [employer]. Care instructions adapted under license by [employer] (which disclaims liability or warranty for this information). If you have questions about a medical condition or this instruction, always ask your healthcare professional. [address] any warranty or liability for your use of this information.

## 2018-08-01 NOTE — PROGRESS NOTES
Patient given Norco and Motrin PO for patient reported pain 5/10 in perineum. See MAR. Call light within reach, bed wheels locked, bed in low position, and side rails up x 3. Patient encouraged to call for assistance. Family at bedside.

## 2018-08-01 NOTE — PROGRESS NOTES
Chart reviewed due to first time parent - no needs identified.  met with family and provided education on [facility].  Family declined referral for home visit.  provided informational packet on  mood disorder education/resources. Family receptive to receiving information and denied any additional needs from . Family has this 's contact information should any needs/questions arise. Christos Apple, [address] [facility]

## 2018-08-01 NOTE — LACTATION NOTE

## 2018-08-01 NOTE — PROGRESS NOTES
Patient is S/P vaginal delivery at 40 4/7 weeks, IOL. No complaints today. Lochia < menses. No GI/ issues. No F/C. Visit Vitals  /66 (BP 1 Location: Right arm, BP Patient Position: At rest)  Pulse 72  Temp 97.6 °F (36.4 °C)  Resp 16  Breastfeeding Unknown CV - RRR 
LUNGS - CTA bilaterally ABD - soft, approp tend, FF below umbilicus EXT - tr edema bilaterally Labs:  No results found for this or any previous visit (from the past 24 hour(s)). PPD #1   Pt is breast feeding and plans on unsure for birth control. Stable. Routine PP instructions Sammie Gross MD 
12:36 PM 
18

## 2018-08-04 NOTE — DISCHARGE SUMMARY
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 Chanell Rico presented at 40w4d for in active labor. Pt had  without incident. See delivery note for all delivery details. Pt's PP course was uneventful. On day of D/C, she was ambulating well, afebrile, with lochia < menses. She was discharged home with medications as below. Pt was breast feeding on discharge. She unsure of P4 method for birth control. Routine PP instructions given to patient. She is to follow up with [facility] in 6 weeks for PP exam.    Discharge Medication List as of 2018  2:13 PM      START taking these medications    Details   HYDROcodone-acetaminophen (NORCO) 5-325 mg per tablet Take 1 Tab by mouth every six (6) hours as needed. Max Daily Amount: 4 Tabs., Print, Disp-15 Tab, R-0      ibuprofen (MOTRIN) 600 mg tablet Take 1 Tab by mouth every six (6) hours as needed. , Print, Disp-60 Tab, R-1         CONTINUE these medications which have NOT CHANGED    Details   ferrous sulfate 325 mg (65 mg iron) tablet Take  by mouth two (2) times a day., Historical Med      fluorometholone (FML) 0.1 % ophthalmic suspension Administer 1 Drop to both eyes daily. , Historical Med      valACYclovir (VALTREX) 500 mg tablet TAKE 1 TABLET BY MOUTH EVERY DAY, Historical Med, R-6      PRENATAL VIT CALC,IRON,FOLIC (PRENATAL VITAMIN PO) Take  by mouth., Historical Med             Sofia Atkinson MD  9:44 AM  18

## 2018-09-10 PROBLEM — Z34.03 PRIMIGRAVIDA IN THIRD TRIMESTER: Status: RESOLVED | Noted: 2017-12-20 | Resolved: 2018-09-10

## 2018-09-10 PROBLEM — O99.013 ANEMIA IN PREGNANCY, THIRD TRIMESTER: Status: RESOLVED | Noted: 2018-05-08 | Resolved: 2018-09-10

## 2018-09-11 PROBLEM — Z3A.40 40 WEEKS GESTATION OF PREGNANCY: Status: RESOLVED | Noted: 2018-07-30 | Resolved: 2018-09-11

## 2018-09-11 PROBLEM — Z34.90 ENCOUNTER FOR PLANNED INDUCTION OF LABOR: Status: RESOLVED | Noted: 2018-07-30 | Resolved: 2018-09-11

## 2020-01-09 PROBLEM — O99.019 ANEMIA AFFECTING PREGNANCY: Status: ACTIVE | Noted: 2020-01-09

## 2020-01-09 PROBLEM — D21.9 FIBROID: Status: ACTIVE | Noted: 2020-01-09

## 2020-01-09 PROBLEM — Z34.90 PREGNANCY: Status: ACTIVE | Noted: 2020-01-09

## 2020-01-15 PROBLEM — A53.0 POSITIVE RPR TEST: Status: ACTIVE | Noted: 2020-01-15

## 2020-02-25 PROBLEM — O09.299 HX OF MACROSOMIA IN INFANT IN PRIOR PREGNANCY, CURRENTLY PREGNANT: Status: ACTIVE | Noted: 2020-02-25

## 2020-03-30 PROBLEM — O09.92 HIGH-RISK PREGNANCY IN SECOND TRIMESTER: Status: ACTIVE | Noted: 2020-01-09

## 2020-04-01 PROBLEM — O35.BXX0 FETAL VENTRICULAR SEPTAL DEFECT AFFECTING ANTEPARTUM CARE OF MOTHER: Status: ACTIVE | Noted: 2020-04-01

## 2020-05-06 NOTE — LACTATION NOTE
OUTPATIENT PROGRESS NOTE    CHIEF COMPLAINT:  Chief Complaint   Patient presents with   • Eye Exam       HPI:  The patient presented to the office for a eye exam.  She does have a history of headaches.  She does have trouble reading the TV.          PAST MEDICAL HISTORY:  Past Medical History:   Diagnosis Date   • Acne    • Anxiety    • Arthritis     low back DJD   • Asthma    • Dyskinesia of esophagus 2013   • Genital herpes    • Genital herpes    • GERD (gastroesophageal reflux disease)    • Multiple nevi- back, abdomen, arms 2014   • Obstructive sleep apnea (adult) (pediatric) 2013    CPAP   • Plantar fasciitis    • PONV (postoperative nausea and vomiting)    • Skin problem    • Sphincter of Oddi dysfunction 2015    EUS 10/7/2013, normal. ERCP 10/7/2013 revealed sphincter of OD dysfunction. Patient had sphincterotomy done at [facility].   • Urinary incontinence, mixed    • Wears glasses        SURGICAL HISTORY:  Past Surgical History:   Procedure Laterality Date   •  section, classic     •  section, low transverse       x 1   • Colposcopy,loop electrd cervix excis         • Coronary angiogram - cv  2009    right   • Cortisone up to 50 mg     • Egd  10/29/2019   • Ercp     • Esophagogastroduodenoscopy  7/8/15    [facility]   • Esophagogastroduodenoscopy  10/11/2017    sokhi   • Esophagogastroduodenoscopy  2018   • Esophagogastroduodenoscopy transoral flex w/bx single or mult  2011    EGD with Bx   • Esophagogastroduodenoscopy transoral flex w/bx single or mult  2007    EGD with Bx   • Hysterectomy  13    LAVH, bilateral salpingectomy - Adenomyosis   • Laparoscopic cholecystectomy  2006   • Laparoscopy,fulgur of oviducts  2008   • Nasal sinus surgery Bilateral 2018    Bilateral sinus surgery including left maxillary and anterior ethmoid sinus regions   • Nasal/maxill sinus endoscopy,dx Left 2018    endoscopic left  This note was copied from a baby's chart. Noted mom wanting an early discharge. Mom reports feedings going well. No problems or questions. Encouraged frequent feeding. Watch output. Call as needed. Mom getting sore, encouraged changing position, lip flange, lanolin or olive/coconut oil. Suggested get baby started sucking on finger to help get in a good rhythm. Mom can hand express before feeding to get milk started and pull out nipple. Colostrum is thick and there is not much volume, so baby can put a lot of pressure on the nipple before swallowing. Once milk volume is in and flowing well, pressure should decrease. Encouraged mom to report any nipple damage or bleeding beyond soreness. partial maxillectomy   • Skin biopsy     • Sling oper stres incontinence  06/15/2011   • Sling oper stres incontinence  2007   • Tubal ligation         FAMILY HISTORY:  Family History   Problem Relation Age of Onset   • Cancer Mother         lung   • COPD Mother    • Cancer, Ovarian Mother    • Cancer Sister    • Diabetes Sister    • Myocardial Infarction Father    • Cancer Father    • High cholesterol Paternal Grandmother    • Hypertension Paternal Grandmother    • Cancer Paternal Grandmother        SOCIAL HISTORY:  Social History     Socioeconomic History   • Marital status: /Civil Union     Spouse name: Not on file   • Number of children: 2   • Years of education: Not on file   • Highest education level: Not on file   Occupational History     Employer: [employer]   Social Needs   • Financial resource strain: Not on file   • Food insecurity:     Worry: Not on file     Inability: Not on file   • Transportation needs:     Medical: Not on file     Non-medical: Not on file   Tobacco Use   • Smoking status: Former Smoker     Packs/day: 0.30     Years: 10.00     Pack years: 3.00     Types: Cigarettes     Last attempt to quit: 10/2/2008     Years since quittin.6   • Smokeless tobacco: Never Used   • Tobacco comment: 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   Substance and Sexual Activity   • Alcohol use: Yes     Alcohol/week: 0.0 - 1.0 standard drinks     Comment: occassionally   • Drug use: No   • Sexual activity: Yes     Partners: Male     Birth control/protection: Pill, Surgical   Lifestyle   • Physical activity:     Days per week: Not on file     Minutes per session: Not on file   • Stress: Not on file   Relationships   • Social connections:     Talks on phone: Not on file     Gets together: Not on file     Attends Religion service: Not on file     Active member of club or organization: Not on file     Attends meetings of clubs or organizations: Not on file      Relationship status: Not on file   • Intimate partner violence:     Fear of current or ex partner: Not on file     Emotionally abused: Not on file     Physically abused: Not on file     Forced sexual activity: Not on file   Other Topics Concern   • Not on file   Social History Narrative    Lives with , son Domingo. 2 cats, 1 rabbit. Employed full-time [employer].         I reviewed testing done by technician found in [facility].    Base Eye Exam     Visual Acuity (Snellen - Linear)       Right Left    Dist cc 20/25 +2 20/30    Correction:  Glasses          Extraocular Movement       Right Left     Full Full          Neuro/Psych     Oriented x3:  Yes    Mood/Affect:  Normal          Dilation     Both eyes:  Paremyd @ 11:25 AM            Slit Lamp and Fundus Exam     External Exam       Right Left    External Normal Normal          Slit Lamp Exam       Right Left    Lids/Lashes Normal Normal    Conjunctiva/Sclera Clear Clear    Cornea Clear Clear    Anterior Chamber Deep and quiet Deep and quiet    Iris Round and regular Round and regular    Lens Clear Clear    Vitreous Clear Clear          Fundus Exam       Right Left    Disc Flat, pink with a healthy rim Flat, pink with a healthy rim    C/D Ratio 0.2 0.2    Macula Healthy with sharp foveal reflex Healthy with sharp foveal reflex    Vessels Healthy with normal Artery-Vein ratio Healthy with normal Artery-Vein ratio    Periphery Flat, healthy, without tears or detachments Flat, healthy, without tears or detachments            Refraction     Wearing Rx       Sphere Cylinder Axis Add    Right -5.50 +1.50 165 +1.75    Left -5.25 +1.25 007 +1.75    Type:  Progressive          Cycloplegic Refraction       Sphere Cylinder Axis Dist VA Add    Right -5.50 +1.50 170 20/25 +1.75    Left -6.00 +1.50 005 20/25 +1.75          Final Rx       Sphere Cylinder Axis Add    Right -5.50 +1.50 170 +2.00    Left -6.00 +1.50 005 +2.00    Type:  Progressive    Expiration Date:   5/7/2022                        ASSESSMENT:  1. Myopia of both eyes with astigmatism and presbyopia    Slight change in refractive error, ocular health within normal limits both eyes      PLAN:    Updated prescription for glasses given complete exam 2 years or sooner if symptoms develop.    Orders Placed This Encounter   • REFRACTION     Return in about 2 years (around 5/6/2022).    Final Rx Glasses  Final Rx       Sphere Cylinder Axis Add    Right -5.50 +1.50 170 +2.00    Left -6.00 +1.50 005 +2.00    Type:  Progressive    Expiration Date:  5/7/2022          Expiration Date:

## 2020-06-24 PROBLEM — O09.93 HIGH-RISK PREGNANCY IN THIRD TRIMESTER: Status: ACTIVE | Noted: 2020-01-09

## 2020-07-31 NOTE — PROGRESS NOTES
Patient ID verified. Allergies, medical history, prenatal record and prior to admission medications verified. Pt instructed to be NPO after midnight. Pt instructed  to call the hospital at 0500 on 8/3 come to entrance C and sign in at the registration desk on the 4th floor. Patient instructed to come to hospital sooner if SROM, labor, or concerning symptoms. Patient verbalized understanding. Questions encouraged and answered.

## 2020-08-03 ENCOUNTER — ANESTHESIA EVENT (OUTPATIENT)
Dept: LABOR AND DELIVERY | Age: 25
DRG: 560 | End: 2020-08-03
Payer: MEDICAID

## 2020-08-03 ENCOUNTER — HOSPITAL ENCOUNTER (INPATIENT)
Age: 25
LOS: 1 days | Discharge: HOME | DRG: 560 | End: 2020-08-04
Attending: OBSTETRICS & GYNECOLOGY | Admitting: OBSTETRICS & GYNECOLOGY
Payer: MEDICAID

## 2020-08-03 ENCOUNTER — ANESTHESIA (OUTPATIENT)
Dept: LABOR AND DELIVERY | Age: 25
DRG: 560 | End: 2020-08-03
Payer: MEDICAID

## 2020-08-03 PROBLEM — Z3A.40 40 WEEKS GESTATION OF PREGNANCY: Status: ACTIVE | Noted: 2020-08-03

## 2020-08-03 PROBLEM — Z34.90 ENCOUNTER FOR INDUCTION OF LABOR: Status: ACTIVE | Noted: 2020-08-03

## 2020-08-03 LAB
ABO + RH BLD: NORMAL
ALBUMIN SERPL-MCNC: 2.8 G/DL (ref 3.5–5)
ALBUMIN/GLOB SERPL: 0.8 {RATIO} (ref 1.2–3.5)
ALP SERPL-CCNC: 108 U/L (ref 50–130)
ALT SERPL-CCNC: 17 U/L (ref 12–65)
ANION GAP SERPL CALC-SCNC: 5 MMOL/L (ref 7–16)
ARTERIAL PATENCY WRIST A: ABNORMAL
ARTERIAL PATENCY WRIST A: ABNORMAL
AST SERPL-CCNC: 22 U/L (ref 15–37)
BASE DEFICIT BLD-SCNC: 8 MMOL/L
BASOPHILS # BLD: 0 K/UL (ref 0–0.2)
BASOPHILS NFR BLD: 0 % (ref 0–2)
BDY SITE: ABNORMAL
BDY SITE: ABNORMAL
BILIRUB SERPL-MCNC: 0.6 MG/DL (ref 0.2–1.1)
BLOOD GROUP ANTIBODIES SERPL: NORMAL
BUN SERPL-MCNC: 6 MG/DL (ref 6–23)
CA-I BLD-MCNC: 1.5 MMOL/L (ref 1.12–1.32)
CALCIUM SERPL-MCNC: 8.4 MG/DL (ref 8.3–10.4)
CHLORIDE SERPL-SCNC: 109 MMOL/L (ref 98–107)
CO2 SERPL-SCNC: 25 MMOL/L (ref 21–32)
COLLECT TIME,HTIME: 1336
COLLECT TIME,HTIME: 1336
CREAT SERPL-MCNC: 0.57 MG/DL (ref 0.6–1)
DIFFERENTIAL METHOD BLD: ABNORMAL
EOSINOPHIL # BLD: 0.1 K/UL (ref 0–0.8)
EOSINOPHIL NFR BLD: 1 % (ref 0.5–7.8)
ERYTHROCYTE [DISTWIDTH] IN BLOOD BY AUTOMATED COUNT: 14.3 % (ref 11.9–14.6)
GAS FLOW.O2 O2 DELIVERY SYS: ABNORMAL L/MIN
GAS FLOW.O2 O2 DELIVERY SYS: ABNORMAL L/MIN
GLOBULIN SER CALC-MCNC: 3.6 G/DL (ref 2.3–3.5)
GLUCOSE BLD STRIP.AUTO-MCNC: 84 MG/DL (ref 65–100)
GLUCOSE BLD STRIP.AUTO-MCNC: 88 MG/DL (ref 65–100)
GLUCOSE SERPL-MCNC: 86 MG/DL (ref 65–100)
HCO3 BLD-SCNC: 22.6 MMOL/L (ref 22–26)
HCO3 BLDV-SCNC: 21.3 MMOL/L (ref 23–28)
HCT VFR BLD AUTO: 34.9 % (ref 35.8–46.3)
HGB BLD-MCNC: 11.7 G/DL (ref 11.7–15.4)
IMM GRANULOCYTES # BLD AUTO: 0 K/UL (ref 0–0.5)
IMM GRANULOCYTES NFR BLD AUTO: 0 % (ref 0–5)
LYMPHOCYTES # BLD: 1.9 K/UL (ref 0.5–4.6)
LYMPHOCYTES NFR BLD: 20 % (ref 13–44)
MCH RBC QN AUTO: 31.9 PG (ref 26.1–32.9)
MCHC RBC AUTO-ENTMCNC: 33.5 G/DL (ref 31.4–35)
MCV RBC AUTO: 95.1 FL (ref 79.6–97.8)
MONOCYTES # BLD: 0.8 K/UL (ref 0.1–1.3)
MONOCYTES NFR BLD: 9 % (ref 4–12)
NEUTS SEG # BLD: 6.5 K/UL (ref 1.7–8.2)
NEUTS SEG NFR BLD: 69 % (ref 43–78)
NRBC # BLD: 0 K/UL (ref 0–0.2)
O2/TOTAL GAS SETTING VFR VENT: 21 %
O2/TOTAL GAS SETTING VFR VENT: 21 %
PCO2 BLD: 65.4 MMHG (ref 35–45)
PCO2 BLDV: 54.1 MMHG (ref 41–51)
PH BLD: 7.15 [PH] (ref 7.35–7.45)
PH BLDV: 7.2 [PH] (ref 7.32–7.42)
PLATELET # BLD AUTO: 188 K/UL (ref 150–450)
PMV BLD AUTO: 11.2 FL (ref 9.4–12.3)
PO2 BLD: 28 MMHG (ref 75–100)
PO2 BLDV: 29 MMHG
POTASSIUM BLD-SCNC: 4.8 MMOL/L (ref 3.5–5.1)
POTASSIUM SERPL-SCNC: 4 MMOL/L (ref 3.5–5.1)
PROT SERPL-MCNC: 6.4 G/DL (ref 6.3–8.2)
RBC # BLD AUTO: 3.67 M/UL (ref 4.05–5.2)
SAO2 % BLD: 36 % (ref 95–98)
SAO2 % BLDV: 41 % (ref 65–88)
SERVICE CMNT-IMP: ABNORMAL
SERVICE CMNT-IMP: ABNORMAL
SODIUM BLD-SCNC: 138 MMOL/L (ref 136–145)
SODIUM SERPL-SCNC: 139 MMOL/L (ref 136–145)
SPECIMEN EXP DATE BLD: NORMAL
SPECIMEN TYPE: ABNORMAL
SPECIMEN TYPE: ABNORMAL
WBC # BLD AUTO: 9.5 K/UL (ref 4.3–11.1)

## 2020-08-03 PROCEDURE — 76060000078 HC EPIDURAL ANESTHESIA

## 2020-08-03 PROCEDURE — 74011250636 HC RX REV CODE- 250/636: Performed by: OBSTETRICS & GYNECOLOGY

## 2020-08-03 PROCEDURE — 77030011943

## 2020-08-03 PROCEDURE — 77030011945 HC CATH URIN INT ST MENT -A

## 2020-08-03 PROCEDURE — 77030018846 HC SOL IRR STRL H20 ICUM -A

## 2020-08-03 PROCEDURE — 82803 BLOOD GASES ANY COMBINATION: CPT

## 2020-08-03 PROCEDURE — A4300 CATH IMPL VASC ACCESS PORTAL: HCPCS | Performed by: ANESTHESIOLOGY

## 2020-08-03 PROCEDURE — 82947 ASSAY GLUCOSE BLOOD QUANT: CPT

## 2020-08-03 PROCEDURE — 74011000250 HC RX REV CODE- 250: Performed by: ANESTHESIOLOGY

## 2020-08-03 PROCEDURE — 3E0R3BZ INTRODUCTION OF ANESTHETIC AGENT INTO SPINAL CANAL, PERCUTANEOUS APPROACH: ICD-10-PCS | Performed by: ANESTHESIOLOGY

## 2020-08-03 PROCEDURE — 75410000000 HC DELIVERY VAGINAL/SINGLE

## 2020-08-03 PROCEDURE — 75410000003 HC RECOV DEL/VAG/CSECN EA 0.5 HR

## 2020-08-03 PROCEDURE — 74011250637 HC RX REV CODE- 250/637: Performed by: OBSTETRICS & GYNECOLOGY

## 2020-08-03 PROCEDURE — 0HQ9XZZ REPAIR PERINEUM SKIN, EXTERNAL APPROACH: ICD-10-PCS | Performed by: OBSTETRICS & GYNECOLOGY

## 2020-08-03 PROCEDURE — 77030014125 HC TY EPDRL BBMI -B: Performed by: ANESTHESIOLOGY

## 2020-08-03 PROCEDURE — 85025 COMPLETE CBC W/AUTO DIFF WBC: CPT

## 2020-08-03 PROCEDURE — 77030002888 HC SUT CHRMC J&J -A

## 2020-08-03 PROCEDURE — 80053 COMPREHEN METABOLIC PANEL: CPT

## 2020-08-03 PROCEDURE — 00HU33Z INSERTION OF INFUSION DEVICE INTO SPINAL CANAL, PERCUTANEOUS APPROACH: ICD-10-PCS | Performed by: ANESTHESIOLOGY

## 2020-08-03 PROCEDURE — 3E033VJ INTRODUCTION OF OTHER HORMONE INTO PERIPHERAL VEIN, PERCUTANEOUS APPROACH: ICD-10-PCS | Performed by: OBSTETRICS & GYNECOLOGY

## 2020-08-03 PROCEDURE — 86900 BLOOD TYPING SEROLOGIC ABO: CPT

## 2020-08-03 PROCEDURE — 75410000002 HC LABOR FEE PER 1 HR

## 2020-08-03 PROCEDURE — 65270000029 HC RM PRIVATE

## 2020-08-03 PROCEDURE — 74011250636 HC RX REV CODE- 250/636: Performed by: NURSE ANESTHETIST, CERTIFIED REGISTERED

## 2020-08-03 RX ORDER — SODIUM CHLORIDE 0.9 % (FLUSH) 0.9 %
5-40 SYRINGE (ML) INJECTION AS NEEDED
Status: DISCONTINUED | OUTPATIENT
Start: 2020-08-03 | End: 2020-08-04 | Stop reason: HOSPADM

## 2020-08-03 RX ORDER — BUTORPHANOL TARTRATE 2 MG/ML
1 INJECTION INTRAMUSCULAR; INTRAVENOUS
Status: DISCONTINUED | OUTPATIENT
Start: 2020-08-03 | End: 2020-08-03 | Stop reason: HOSPADM

## 2020-08-03 RX ORDER — OXYCODONE AND ACETAMINOPHEN 7.5; 325 MG/1; MG/1
2 TABLET ORAL
Status: DISCONTINUED | OUTPATIENT
Start: 2020-08-03 | End: 2020-08-04 | Stop reason: HOSPADM

## 2020-08-03 RX ORDER — OXYCODONE AND ACETAMINOPHEN 5; 325 MG/1; MG/1
1 TABLET ORAL
Status: DISCONTINUED | OUTPATIENT
Start: 2020-08-03 | End: 2020-08-04 | Stop reason: HOSPADM

## 2020-08-03 RX ORDER — LIDOCAINE HYDROCHLORIDE 20 MG/ML
JELLY TOPICAL
Status: DISCONTINUED | OUTPATIENT
Start: 2020-08-03 | End: 2020-08-03 | Stop reason: HOSPADM

## 2020-08-03 RX ORDER — IBUPROFEN 400 MG/1
400 TABLET ORAL
Status: DISCONTINUED | OUTPATIENT
Start: 2020-08-03 | End: 2020-08-03

## 2020-08-03 RX ORDER — IBUPROFEN 800 MG/1
800 TABLET ORAL
Status: DISCONTINUED | OUTPATIENT
Start: 2020-08-03 | End: 2020-08-04 | Stop reason: HOSPADM

## 2020-08-03 RX ORDER — OXYTOCIN/RINGER'S LACTATE 30/500 ML
0-25 PLASTIC BAG, INJECTION (ML) INTRAVENOUS
Status: DISCONTINUED | OUTPATIENT
Start: 2020-08-03 | End: 2020-08-04 | Stop reason: HOSPADM

## 2020-08-03 RX ORDER — NALOXONE HYDROCHLORIDE 0.4 MG/ML
0.4 INJECTION, SOLUTION INTRAMUSCULAR; INTRAVENOUS; SUBCUTANEOUS AS NEEDED
Status: DISCONTINUED | OUTPATIENT
Start: 2020-08-03 | End: 2020-08-04 | Stop reason: HOSPADM

## 2020-08-03 RX ORDER — FLUOROMETHOLONE 1 MG/ML
1 SOLUTION/ DROPS OPHTHALMIC DAILY
Status: DISCONTINUED | OUTPATIENT
Start: 2020-08-04 | End: 2020-08-04 | Stop reason: HOSPADM

## 2020-08-03 RX ORDER — DIPHENHYDRAMINE HCL 25 MG
25 CAPSULE ORAL
Status: DISCONTINUED | OUTPATIENT
Start: 2020-08-03 | End: 2020-08-04 | Stop reason: HOSPADM

## 2020-08-03 RX ORDER — SODIUM CHLORIDE 0.9 % (FLUSH) 0.9 %
5-40 SYRINGE (ML) INJECTION EVERY 8 HOURS
Status: DISCONTINUED | OUTPATIENT
Start: 2020-08-03 | End: 2020-08-04 | Stop reason: HOSPADM

## 2020-08-03 RX ORDER — LIDOCAINE HYDROCHLORIDE AND EPINEPHRINE 15; 5 MG/ML; UG/ML
INJECTION, SOLUTION EPIDURAL
Status: COMPLETED | OUTPATIENT
Start: 2020-08-03 | End: 2020-08-03

## 2020-08-03 RX ORDER — DEXTROSE, SODIUM CHLORIDE, SODIUM LACTATE, POTASSIUM CHLORIDE, AND CALCIUM CHLORIDE 5; .6; .31; .03; .02 G/100ML; G/100ML; G/100ML; G/100ML; G/100ML
125 INJECTION, SOLUTION INTRAVENOUS CONTINUOUS
Status: DISCONTINUED | OUTPATIENT
Start: 2020-08-03 | End: 2020-08-04 | Stop reason: HOSPADM

## 2020-08-03 RX ORDER — VALACYCLOVIR HYDROCHLORIDE 500 MG/1
500 TABLET, FILM COATED ORAL DAILY
Status: DISCONTINUED | OUTPATIENT
Start: 2020-08-04 | End: 2020-08-04 | Stop reason: HOSPADM

## 2020-08-03 RX ORDER — OXYTOCIN/RINGER'S LACTATE 30/500 ML
250 PLASTIC BAG, INJECTION (ML) INTRAVENOUS ONCE
Status: DISPENSED | OUTPATIENT
Start: 2020-08-03 | End: 2020-08-03

## 2020-08-03 RX ORDER — MINERAL OIL
120 OIL (ML) ORAL
Status: DISCONTINUED | OUTPATIENT
Start: 2020-08-03 | End: 2020-08-03 | Stop reason: HOSPADM

## 2020-08-03 RX ORDER — ROPIVACAINE HYDROCHLORIDE 2 MG/ML
INJECTION, SOLUTION EPIDURAL; INFILTRATION; PERINEURAL
Status: DISCONTINUED | OUTPATIENT
Start: 2020-08-03 | End: 2020-08-03 | Stop reason: HOSPADM

## 2020-08-03 RX ORDER — ZOLPIDEM TARTRATE 5 MG/1
5 TABLET ORAL
Status: DISCONTINUED | OUTPATIENT
Start: 2020-08-03 | End: 2020-08-04 | Stop reason: HOSPADM

## 2020-08-03 RX ORDER — LIDOCAINE HYDROCHLORIDE 10 MG/ML
1 INJECTION INFILTRATION; PERINEURAL
Status: DISCONTINUED | OUTPATIENT
Start: 2020-08-03 | End: 2020-08-03 | Stop reason: HOSPADM

## 2020-08-03 RX ORDER — SIMETHICONE 80 MG
80 TABLET,CHEWABLE ORAL
Status: DISCONTINUED | OUTPATIENT
Start: 2020-08-03 | End: 2020-08-04 | Stop reason: HOSPADM

## 2020-08-03 RX ADMIN — IBUPROFEN 800 MG: 800 TABLET, FILM COATED ORAL at 16:47

## 2020-08-03 RX ADMIN — SODIUM CHLORIDE, SODIUM LACTATE, POTASSIUM CHLORIDE, CALCIUM CHLORIDE, AND DEXTROSE MONOHYDRATE 125 ML/HR: 600; 310; 30; 20; 5 INJECTION, SOLUTION INTRAVENOUS at 07:27

## 2020-08-03 RX ADMIN — WITCH HAZEL 1 PAD: 500 SOLUTION RECTAL; TOPICAL at 18:27

## 2020-08-03 RX ADMIN — SODIUM CHLORIDE, SODIUM LACTATE, POTASSIUM CHLORIDE, AND CALCIUM CHLORIDE 500 ML: 600; 310; 30; 20 INJECTION, SOLUTION INTRAVENOUS at 10:27

## 2020-08-03 RX ADMIN — LIDOCAINE HYDROCHLORIDE,EPINEPHRINE BITARTRATE 3 ML: 15; .005 INJECTION, SOLUTION EPIDURAL; INFILTRATION; INTRACAUDAL; PERINEURAL at 10:51

## 2020-08-03 RX ADMIN — Medication 2 MILLI-UNITS/MIN: at 07:52

## 2020-08-03 RX ADMIN — ROPIVACAINE HYDROCHLORIDE 10 ML/HR: 2 INJECTION, SOLUTION EPIDURAL; INFILTRATION at 10:57

## 2020-08-03 NOTE — PROGRESS NOTES
Admission Note    Presented for scheduled induction. Pt states she had a phone interview with Antonia Mcnally RN and the information that was obtained has not changed. POC reviewed, oriented to room and call light. IV started, lab work drawn and sent to lab. Consents witnessed.

## 2020-08-03 NOTE — PROGRESS NOTES
08/03/20 0954   Cervical Exam   Dilation (cm) 2.5   Position Posterior   Vaginal exam done by?   Dr Lupillo Heard Status AROM   SVE per Dr Beatrice Bronson

## 2020-08-03 NOTE — H&P
Meño Dao  [id number]      History and Physical  Subjective:     Patient is a 31 y.o.  female presents with induction term. See office notes on prenatal care. Was seen by MFM for fetal echo could not see vsd at last us    Lab Results   Component Value Date/Time    ABO/Rh(D) A POSITIVE 08/03/2020 07:32 AM    Antibody screen, External negative 01/09/2020    Antibody screen NEG 08/03/2020 07:32 AM    Rubella, External immune 01/09/2020    HBsAg, External negative 01/09/2020    HIV, External non reactive 01/09/2020    RPR, External Reactive 1:1 01/09/2020    ABO,Rh A positive 01/09/2020               Patient Active Problem List    Diagnosis Date Noted    40 weeks gestation of pregnancy 08/03/2020     Priority: 1 - One    Encounter for induction of labor 08/03/2020    Fetal ventricular septal defect affecting antepartum care of mother 04/01/2020    Hx of macrosomia in infant in prior pregnancy, currently pregnant 02/25/2020    Positive RPR test 01/15/2020    High-risk pregnancy in third trimester 01/09/2020    Fibroid 01/09/2020    Anemia affecting pregnancy 01/09/2020    Herpes simplex of eye 12/20/2017     Past Medical History:   Diagnosis Date    Anemia in pregnancy, third trimester     Corneal ulcer of left eye     Fetal ventricular septal defect affecting antepartum care of mother 4/1/2020    Herpes     eye lesion      Past Surgical History:   Procedure Laterality Date    HX WISDOM TEETH EXTRACTION        Prior to Admission Medications   Prescriptions Last Dose Informant Patient Reported? Taking? PRENATAL VIT CALC,IRON,FOLIC (PRENATAL VITAMIN PO) 8/2/2020 at Unknown time  Yes Yes   Sig: Take  by mouth.   calcium carbonate (TUMS) 200 mg calcium (500 mg) chew 8/2/2020 at Unknown time  Yes Yes   Sig: Take 1 Tab by mouth daily. ferrous sulfate 325 mg (65 mg iron) tablet 8/2/2020 at Unknown time  Yes Yes   Sig: Take  by mouth daily.    fluorometholone (FML) 0.1 % ophthalmic suspension 8/2/2020 at Unknown time  Yes Yes   Sig: Administer 1 Drop to both eyes daily. valACYclovir (VALTREX) 500 mg tablet 8/2/2020 at Unknown time  Yes Yes   Sig: TAKE 1 TABLET BY MOUTH EVERY DAY      Facility-Administered Medications: None     No Known Allergies   Social History     Tobacco Use    Smoking status: Never Smoker    Smokeless tobacco: Never Used   Substance Use Topics    Alcohol use: No     Comment: none since +pt      Family History   Problem Relation Age of Onset    No Known Problems Mother     No Known Problems Father           Objective:     Patient Vitals for the past 8 hrs:   BP Temp Pulse Resp Weight   08/03/20 0840 105/66  75     08/03/20 0825 111/71  78     08/03/20 0811 113/71  73     08/03/20 0734 128/77 99 °F (37.2 °C) 85 20    08/03/20 0724     205 lb (93 kg)        Fetal Monitoring:    Patient Vitals for the past 4 hrs:    Mode Fetal Heart Rate Fetal Activity Variability Decelerations Accelerations Non Stress Test   08/03/20 1000 External 145  6-25 BPM  Yes    08/03/20 0945 External 145  6-25 BPM      08/03/20 0930 External 140  6-25 BPM  Yes    08/03/20 0915 External 140  6-25 BPM  Yes    08/03/20 0900 External 140  6-25 BPM  Yes    08/03/20 0845 External   6-25 BPM      08/03/20 0826 External 140  6-25 BPM  Yes Reactive   08/03/20 0815 External 140  6-25 BPM  Yes    08/03/20 0800 External 140  6-25 BPM  Yes    08/03/20 0734 External 140 Present 6-25 BPM None Yes Reactive   08/03/20 0733 External 140  6-25 BPM  Yes       Uterine Activity: Frequency: Every 1-3 minutes   Dilation: 3 cm   Effacement: 50%   Station: Floating    Assessment:     Principal Problem:    40 weeks gestation of pregnancy (8/3/2020)    Active Problems:    Hx of macrosomia in infant in prior pregnancy, currently pregnant (2/25/2020)      Overview: 8lb14.9 oz delivery G1      Will need 18 and 28 week glucola            18 week GTT- 77            28 weeks-92  HGB-10.8-start iron 3-4 days weekly                  Fetal ventricular septal defect affecting antepartum care of mother (4/1/2020)      Overview: 4/1/2020 at [facility]:  Normal anatomy/fetal Echo with exception of small VSDs. AC 58%, overall 37%, ROLDAN WNL, UA Dopplers WNL. 6/24/2020 at [facility]:  Appropriate fetal growth, VSD not seen today;       reassuring fetal status. · No follow up scheduled at [facility]; will see back prn.       Encounter for induction of labor (8/3/2020)        Plan:     Reassuring fetal status, Labor  Progressing normally, Continue plan for vaginal delivery   Recent US at 34 weeks 5'11\" AC 60% EFW 53% roldan 20

## 2020-08-03 NOTE — PROGRESS NOTES
Admission assessment complete as noted. Patient oriented to room and unit. Plan of care reviewed and patient verbalizes understanding. Questions encouraged and answered. Patent encouraged to call for needs or concerns. Safety Teaching reviewed:   1. Hand hygiene prior to handling the infant. 2. Use of bulb syringe. 3. Bracelets with matching numbers are placed on mother and infant  4. An infant security tag  [facility]) is placed on the infant's ankle and monitored  5. All OB nurses wear pink Employee badges - do not give your baby to anyone without proper identification. 6. Never leave the baby alone in the room. 7. The infant should be placed on their back to sleep. on a firm mattress. No toys should be placed in the crib. (safe sleep video offered to view)  8. Never shake the baby (video offered to view)  9. Infant fall prevention - do not sleep with the baby, and place the baby in the crib while ambulating. 8. Mother and Baby Care booklet given to Mother.

## 2020-08-03 NOTE — ANESTHESIA PREPROCEDURE EVALUATION
Relevant Problems   No relevant active problems       Anesthetic History               Review of Systems / Medical History  Patient summary reviewed and pertinent labs reviewed    Pulmonary                   Neuro/Psych              Cardiovascular                  Exercise tolerance: >4 METS     GI/Hepatic/Renal  Within defined limits              Endo/Other  Within defined limits           Other Findings   Comments:  intrauterine pregnancy           Physical Exam    Airway  Mallampati: I  TM Distance: 4 - 6 cm  Neck ROM: normal range of motion, short neck        Cardiovascular    Rhythm: regular  Rate: normal         Dental  No notable dental hx       Pulmonary  Breath sounds clear to auscultation               Abdominal         Other Findings            Anesthetic Plan    ASA: 2  Anesthesia type: epidural            Anesthetic plan and risks discussed with: Patient and Spouse

## 2020-08-03 NOTE — L&D DELIVERY NOTE
Delivery Summary    Patient: Bernarda Truong MRN: [id number]  SSN: [id number]    YOB: 1995  Age: 31 y.o. Sex: female       Information for the patient's :  Cortney Larry [[id number]]       Labor Events:    Labor: No    Steroids: None   Cervical Ripening Date/Time:       Cervical Ripening Type: None   Antibiotics During Labor: No   Rupture Identifier: Sac 1    Rupture Date/Time: 8/3/2020 9:54 AM   Rupture Type: AROM   Amniotic Fluid Volume: Moderate    Amniotic Fluid Description: Meconium    Amniotic Fluid Odor:      Induction: Oxytocin       Induction Date/Time: 8/3/2020 7:52 AM    Indications for Induction: Elective    Augmentation:     Augmentation Date/Time:      Indications for Augmentation:     Labor complications: None       Additional complications:        Delivery Events:  Indications For Episiotomy:     Episiotomy: None   Perineal Laceration(s): 1st   Repaired: Yes   Periurethral Laceration Location:      Repaired:     Labial Laceration Location:     Repaired:     Sulcal Laceration Location:     Repaired:     Vaginal Laceration Location:     Repaired:     Cervical Laceration Location:     Repaired:     Repair Suture: Chromic 2-0   Number of Repair Packets: 1   Estimated Blood Loss (ml):  ml   Quantitative Blood Loss (ml)                Delivery Date: 8/3/2020    Delivery Time: 1:36 PM  Delivery Type: Vaginal, Spontaneous  Sex:  Female    Gestational Age: 41w4d   Delivery Clinician:  Danielle Richardson  Living Status: Living   Delivery Location:   436          APGARS  One minute Five minutes Ten minutes   Skin color: 1   1        Heart rate: 2   2        Grimace: 2   2        Muscle tone: 2   2        Breathin   2        Totals: 9   9            Presentation: Vertex    Position: Left Occiput Anterior  Resuscitation Method:  Suctioning-bulb     Meconium Stained:  Thin      Cord Information: 3 Vessels  Complications: None  Cord around:    Delayed cord clamping? No  Cord clamped date/time:8/3/2020  1:37 PM  Disposition of Cord Blood: Lab    Blood Gases Sent?:      Placenta:  Date/Time: 8/3/2020  1:40 PM  Removal: Spontaneous      Appearance: Normal      Measurements:  Birth Weight: 8 lb 1.5 oz (3.67 kg)      Birth Length: 1' 9.46\" (0.545 m)      Head Circumference: 1' 1.98\" (0.355 m)      Chest Circumference: 1' 1.39\" (0.34 m)     Abdominal Girth: Other Providers:   JARROD HERRERA;ARYAN LEÓN;SHANTA ENCISO;BIGG WALTERS;GAMALIEL VILLARREAL;Aracelis VARELA IV, Obstetrician;Primary Nurse;Primary Irving Nurse;Scrub Tech;Neonatologist;Anesthesiologist;Crna; Charge Nurse           Group B Strep: No results found for: GRBSEXT, GRBSEXT  Information for the patient's :  Melvin Calvinshimontonia [[id number]]   No results found for: ABORH, PCTABR, PCTDIG, BILI, ABORHEXT, ABORH     Recent Labs     20  1350 20  1345   HCO3I  --  22.6   SO2I  --  36*   IBD  --  8   SPECTI VENOUS BLOOD ARTERIAL   ISITE CORD CORD   IDEV ROOM AIR ROOM AIR   IALLEN NOT APPLICABLE NOT APPLICABLE      CTSP secondary to pushing, Noted to be OP, rotated baby with hand to KIRK and held baby in position while pushing,  over small superficial laceration on perineum. Placenta spontaneously delivered. Small 5 mm superficial laceration oozing single 2.0 chromic placed with excellent hemostasis and cosmesis.

## 2020-08-03 NOTE — PROGRESS NOTES
SBAR OUT Report: Mother    Verbal report given to Keshav Umaña RN on this patient, who is now being transferred to MIU for routine progression of care. The patient is not wearing a green \"Anesthesia-Duramorph\" band. Report consisted of patient's Situation, Background, Assessment and Recommendations (SBAR). Zwingle ID bands were compared with the identification form, and verified with the patient and receiving nurse. Information from the Procedure Summary, Intake/Output and MAR and the Rosa Report was reviewed with the receiving nurse; opportunity for questions and clarification provided.

## 2020-08-04 VITALS
WEIGHT: 205 LBS | TEMPERATURE: 99.4 F | SYSTOLIC BLOOD PRESSURE: 110 MMHG | OXYGEN SATURATION: 97 % | HEART RATE: 74 BPM | DIASTOLIC BLOOD PRESSURE: 64 MMHG | BODY MASS INDEX: 35.19 KG/M2 | RESPIRATION RATE: 16 BRPM

## 2020-08-04 PROCEDURE — 74011250637 HC RX REV CODE- 250/637: Performed by: OBSTETRICS & GYNECOLOGY

## 2020-08-04 RX ORDER — IBUPROFEN 800 MG/1
800 TABLET ORAL
Qty: 30 TAB | Refills: 0 | Status: SHIPPED | OUTPATIENT
Start: 2020-08-04

## 2020-08-04 RX ADMIN — IBUPROFEN 800 MG: 800 TABLET, FILM COATED ORAL at 06:54

## 2020-08-04 RX ADMIN — OXYCODONE HYDROCHLORIDE AND ACETAMINOPHEN 1 TABLET: 5; 325 TABLET ORAL at 06:54

## 2020-08-04 RX ADMIN — IBUPROFEN 800 MG: 800 TABLET, FILM COATED ORAL at 00:21

## 2020-08-04 RX ADMIN — IBUPROFEN 800 MG: 800 TABLET, FILM COATED ORAL at 14:14

## 2020-08-04 NOTE — PROGRESS NOTES
Chart reviewed - no needs identified. SW met with patient/ while implementing social distancing. Patient denies any history of postpartum depression/anxiety. Family has applied for Medicaid to cover pregnancy/delivery. Patient given informational packet on  mood & anxiety disorders (resources/education). Family denies any additional needs from  at this time. Family has 's contact information should any needs/questions arise.     BAYRON Cristina-DENZEL  [facility]   [phone]

## 2020-08-04 NOTE — LACTATION NOTE
Mom and baby are going home today. Continue to offer the breast without restriction. Mom's milk should be fully in over the next few days. Reviewed engorgement precautions. Hand Expression has been demoed and written hand-out reviewed. As milk comes in baby will be more alert at the breast and swallows will be more obvious. Breasts may feel softer once baby has finished nursing. Baby should be back to birth weight by 3weeks of age. And then gain on average 1 oz per day for the next 2-3 months. Reviewed babies should be exclusively breastfeeding for the first 6 months and that breastfeeding should continue after introduction of appropriate complimentary foods after 6 months. Initial output should be at least 1 wet and 1 bowel movement for each day old baby is. By day 5-7 once milk is fully in baby will consistently have 6 or more soaking wet diapers and about 4 bowel movement. Some babies have a bowel movement with every feeding and some have 1-3 large bowel movements each day. Inadequate output may indicate inadequate feedings and should be reported to your Pediatrician. Bowel habits may change as baby gets older. Encouraged follow-up at Pediatrician in 1-2 days, no later than 1 week of age. Call [facility] for any questions as needed or to set up an OP visit. OP phone calls are returned within 24 hours. Community Breastfeeding Resource List given.

## 2020-08-04 NOTE — LACTATION NOTE
In to see mom and infant for the first time. Experienced mom stated that infant has been latching and nursing well. She also stated that infant had been spitty and has spit up mucous. Informed mom that this is normal and should pass after a few hours. Reviewed expectations of the first 24 hours as well as the second night of life. Mom stated that she plans to discharge to home after 24 hours if healthcare provider agrees. Mom stated that she feels confident and has no concerns at this time. Encouraged mom to follow up with our outpatient lactation consultant as needed.

## 2020-08-04 NOTE — DISCHARGE SUMMARY
Obstetrical Discharge Summary     Name: Jeaneth Nunes MRN: [id number]  SSN: [id number]    YOB: 1995  Age: 31 y.o. Sex: female      Allergies: Patient has no known allergies. Admit Date: 8/3/2020    Discharge Date: 2020     Admitting Physician: Abhay Long DO     Attending Physician:  Lc Werner, *     * Admission Diagnoses: 40 weeks gestation of pregnancy [Z3A.40]; Encounter for induction of labor [Z34.90]    * Discharge Diagnoses:   Information for the patient's :  Christopher Vera [[id number]]   Delivery of a 8 lb 1.5 oz (3.67 kg) female infant via Vaginal, Spontaneous on 8/3/2020 at 1:36 PM  by Abhay Long. Apgars were 9  and 9 . Additional Diagnoses:   Hospital Problems as of 2020 Date Reviewed: 8/3/2020          Codes Class Noted - Resolved POA    * (Principal) 40 weeks gestation of pregnancy ICD-10-CM: Z3A.40  ICD-9-CM: V22.2  8/3/2020 - Present Yes        Encounter for induction of labor ICD-10-CM: Z34.90  ICD-9-CM: V22.1  8/3/2020 - Present Yes        Fetal ventricular septal defect affecting antepartum care of mother ICD-10-CM: O35. 8XX0  ICD-9-CM: 655.83  2020 - Present Yes    Overview Addendum 2020 12:14 PM by Jose Manuel Costa RN     2020 at [facility]:  Normal anatomy/fetal Echo with exception of small VSDs. AC 58%, overall 37%, ROLDAN WNL, UA Dopplers WNL. 2020 at [facility]:  Appropriate fetal growth, VSD not seen today; reassuring fetal status. · No follow up scheduled at [facility]; will see back prn.              Hx of macrosomia in infant in prior pregnancy, currently pregnant ICD-10-CM: O09.299  ICD-9-CM: V23.49  2020 - Present Yes    Overview Addendum 2020  9:11 AM by Graciela Jacobsen     8lb14.9 oz delivery G1  Will need 18 and 28 week glucola    18 week GTT- 77    28 weeks-92  HGB-10.8-start iron 3-4 days weekly                      Lab Results   Component Value Date/Time    ABO/Rh(D) A POSITIVE 2020 07:32 AM    Rubella, External immune 2020    ABO,Rh A positive 2020    There is no immunization history for the selected administration types on file for this patient. * Procedures:   * No surgery found *           * Discharge Condition: good    [facility] Course: Normal hospital course following the delivery. * Disposition: Home    Discharge Medications:   Current Discharge Medication List      START taking these medications    Details   ibuprofen (MOTRIN) 800 mg tablet Take 1 Tab by mouth every six (6) hours as needed for Pain. Qty: 30 Tab, Refills: 0         CONTINUE these medications which have NOT CHANGED    Details   valACYclovir (VALTREX) 500 mg tablet TAKE 1 TABLET BY MOUTH EVERY DAY  Refills: 6      PRENATAL VIT CALC,IRON,FOLIC (PRENATAL VITAMIN PO) Take  by mouth. STOP taking these medications       calcium carbonate (TUMS) 200 mg calcium (500 mg) chew Comments:   Reason for Stopping:         ferrous sulfate 325 mg (65 mg iron) tablet Comments:   Reason for Stopping:         fluorometholone (FML) 0.1 % ophthalmic suspension Comments:   Reason for Stopping:               * Follow-up Care/Patient Instructions:   Activity: No sex for 6 weeks      Follow-up Information     Follow up With Specialties Details Why [facility], [address]ff[address] MD Psychiatry   [address] [place], [place] [id number]  [phone]

## 2020-08-04 NOTE — LACTATION NOTE

## 2020-08-04 NOTE — DISCHARGE INSTRUCTIONS
Discharge instruction to follow: Activity: Pelvis rest for 6 weeks     No heavy lifting over 15 lbs for 2 weeks     No driving for 2 weeks     No push/pull motion such as sweeping or vacuuming for 2 weeks     No tub baths for 6 weeks    Continue using the jhoana bottle after each void or bowel movement. If using sitz bath continue until comfortable stopping. If using jhoana-bottle continue to use until comfortable stopping. Change sanitary pad after each urination or bowel movement. Call MD for the following:  Fever over 101 F; pain not relieved by medication; foul smelling vaginal discharge or an increase in vaginal bleeding. Take medication as prescribed. Follow up with MD as order. Patient Education        Vaginal Childbirth: Care Instructions  Your Care Instructions     Your body will slowly heal in the next few weeks. It is easy to get too tired and overwhelmed during the first weeks after your baby is born. Changes in your hormones can shift your mood without warning. You may find it hard to meet the extra demands on your energy and time. Take it easy on yourself. Follow-up care is a key part of your treatment and safety. Be sure to make and go to all appointments, and call your doctor if you are having problems. It's also a good idea to know your test results and keep a list of the medicines you take. How can you care for yourself at home? · Vaginal bleeding and cramps  ? After delivery, you will have a bloody discharge from the vagina. This will turn pink within a week and then white or yellow after about 10 days. It may last for 2 to 4 weeks or longer, until the uterus has healed. Use pads instead of tampons until you stop bleeding. ? Do not worry if you pass some blood clots, as long as they are smaller than a golf ball. If you have a tear or stitches in your vaginal area, change the pad at least every 4 hours to prevent soreness and infection. ?  You may have cramps for the first few days after childbirth. These are normal and occur as the uterus shrinks to normal size. Take an over-the-counter pain medicine, such as acetaminophen (Tylenol), ibuprofen (Advil, Motrin), or naproxen (Aleve), for cramps. Read and follow all instructions on the label. Do not take aspirin, because it can cause more bleeding. ? Do not take two or more pain medicines at the same time unless the doctor told you to. Many pain medicines have acetaminophen, which is Tylenol. Too much acetaminophen (Tylenol) can be harmful. · Stitches  ? If you have stitches, they will dissolve on their own and do not need to be removed. Follow your doctor's instructions for cleaning the stitched area. ? Put ice or a cold pack on your painful area for 10 to 20 minutes at a time, several times a day, for the first few days. Put a thin cloth between the ice and your skin. ? Sit in a few inches of warm water (sitz bath) 3 times a day and after bowel movements. The warm water helps with pain and itching. If you do not have a tub, a warm shower might help. · Breast fullness  ? Your breasts may overfill (engorge) in the first few days after delivery. To help milk flow and to relieve pain, warm your breasts in the shower or by using warm, moist towels before nursing. ? If you are not nursing, do not put warmth on your breasts or touch your breasts. Wear a tight bra or sports bra and use ice until the fullness goes away. This usually takes 2 to 3 days. ? Put ice or a cold pack on your breast after nursing to reduce swelling and pain. Put a thin cloth between the ice and your skin. · Activity  ? Eat a balanced diet. Do not try to lose weight by cutting calories. Keep taking your prenatal vitamins, or take a multivitamin. ? Get as much rest as you can. Try to take naps when your baby sleeps during the day. ? Get some exercise every day. But do not do any heavy exercise until your doctor says it is okay.   ? Wait until you are healed (about 4 to 6 weeks) before you have sexual intercourse. Your doctor will tell you when it is okay to have sex. ? Talk to your doctor about birth control. You can get pregnant even before your period returns. Also, you can get pregnant while you are breastfeeding. · Mental health  ? It is normal to have some sadness, anxiety, sleeplessness, and mood swings after you go home. If you feel upset or hopeless for more than a few days or are having trouble doing the things you need to do, talk to your doctor. · Constipation and hemorrhoids  ? Drink plenty of fluids, enough so that your urine is light yellow or clear like water. If you have kidney, heart, or liver disease and have to limit fluids, talk with your doctor before you increase the amount of fluids you drink. ? Eat plenty of fiber each day. Have a bran muffin or bran cereal for breakfast, and try eating a piece of fruit for a mid-afternoon snack. ? For painful, itchy hemorrhoids, put ice or a cold pack on the area several times a day for 10 minutes at a time. Follow this by putting a warm compress on the area for another 10 to 20 minutes or by sitting in a shallow, warm bath. When should you call for help? Call  911 anytime you think you may need emergency care. For example, call if:  · You have thoughts of harming yourself, your baby, or another person. · You passed out (lost consciousness). · You have chest pain, are short of breath, or cough up blood. · You have a seizure. Call your doctor now or seek immediate medical care if:  · You have severe vaginal bleeding. · You are dizzy or lightheaded, or you feel like you may faint. · You have a fever. · You have new or more pain in your belly or pelvis. · You have symptoms of a blood clot in your leg (called a deep vein thrombosis), such as:  ? Pain in the calf, back of the knee, thigh, or groin. ? Redness and swelling in your leg or groin.   · You have signs of preeclampsia, such as:  ? Sudden swelling of your face, hands, or feet. ? New vision problems (such as dimness, blurring, or seeing spots). ? A severe headache. Watch closely for changes in your health, and be sure to contact your doctor if:  · Your vaginal bleeding seems to be getting heavier. · You have new or worse vaginal discharge. · You feel sad, anxious, or hopeless for more than a few days. · You do not get better as expected. Where can you learn more? Go to http://www.gray.com/  Enter Q237 in the search box to learn more about \"Vaginal Childbirth: Care Instructions. \"  Current as of: February 11, 2020               Content Version: 12.5  © 3227-5910 [employer], [employer]. Care instructions adapted under license by [employer] (which disclaims liability or warranty for this information). If you have questions about a medical condition or this instruction, always ask your healthcare professional. [address] any warranty or liability for your use of this information.

## 2020-08-04 NOTE — PROGRESS NOTES
Post-Partum Day Number 1 Progress Note discharge    Patient doing well post-partum without significant complaint. Voiding withour difficulty, normal lochia. Desires discharge  Vitals:    Patient Vitals for the past 8 hrs:   BP Temp Pulse Resp SpO2   20 0700 110/64 99.4 °F (37.4 °C) 74 16 97 %     Temp (24hrs), Av.4 °F (36.9 °C), Min:97.9 °F (36.6 °C), Max:99.4 °F (37.4 °C)      Vital signs stable, afebrile. Exam:  Patient without distress. Abdomen soft, fundus firm at level of umbilicus, nontender               Perineum with normal lochia noted. Lower extremities are negative for swelling, cords or tenderness.     Labs:   Recent Results (from the past 24 hour(s))   POC CG8I    Collection Time: 20  1:45 PM   Result Value Ref Range    Device: ROOM AIR      FIO2 (POC) 21 %    pH (POC) 7.15 (LL) 7.35 - 7.45      pCO2 (POC) 65.4 (HH) 35 - 45 MMHG    pO2 (POC) 28 (LL) 75 - 100 MMHG    HCO3 (POC) 22.6 22 - 26 MMOL/L    sO2 (POC) 36 (L) 95 - 98 %    Base deficit (POC) 8 mmol/L    Allens test (POC) NOT APPLICABLE      Site CORD      Specimen type (POC) ARTERIAL      Sodium,  136 - 145 MMOL/L    Potassium, POC 4.8 3.5 - 5.1 MMOL/L    Glucose, POC 84 65 - 100 MG/DL    Calcium, ionized (POC) 1.50 (H) 1.12 - 1.32 mmol/L    Performed by Aileen)RTBS     COLLECT TIME 1,336     POC CG8I ISTAT, VENOUS    Collection Time: 20  1:50 PM   Result Value Ref Range    Device: ROOM AIR      FIO2 (POC) 21 %    pH, venous (POC) 7.20 (L) 7.32 - 7.42      pCO2, venous (POC) 54.1 (H) 41 - 51 MMHG    pO2, venous (POC) 29 mmHg    HCO3, venous (POC) 21.3 (L) 23 - 28 MMOL/L    sO2, venous (POC) 41 (L) 65 - 88 %    Allens test (POC) NOT APPLICABLE      Site CORD      Specimen type (POC) VENOUS BLOOD      Glucose, POC 88 65 - 100 MG/DL    Performed by Aileen)RTBS     COLLECT TIME 1,336         Assessment and Plan:  Patient appears to be having uncomplicated post-partum course. Continue routine perineal care and maternal education. Plan discharge tomorrow if no problems occur.

## 2020-08-04 NOTE — LACTATION NOTE
This note was copied from a baby's chart. Infant has not been consistent with feedings throughout the night. Has had 2-3 successful feedings. RN recommends mother start to pump to help establish milk supply. Mother agrees. Pump and parts supplied. Educated on use. Infant showing feeding cues post bath. Pumping interrupted for latch. Pumped 0.3 mL. Educated on syringe feeding colostrum once feeding is complete.